# Patient Record
Sex: FEMALE | Race: WHITE | NOT HISPANIC OR LATINO | Employment: FULL TIME | URBAN - METROPOLITAN AREA
[De-identification: names, ages, dates, MRNs, and addresses within clinical notes are randomized per-mention and may not be internally consistent; named-entity substitution may affect disease eponyms.]

---

## 2022-11-04 ENCOUNTER — OFFICE VISIT (OUTPATIENT)
Dept: FAMILY MEDICINE | Facility: CLINIC | Age: 32
End: 2022-11-04
Payer: COMMERCIAL

## 2022-11-04 VITALS
TEMPERATURE: 98.1 F | OXYGEN SATURATION: 98 % | SYSTOLIC BLOOD PRESSURE: 130 MMHG | RESPIRATION RATE: 16 BRPM | DIASTOLIC BLOOD PRESSURE: 85 MMHG | BODY MASS INDEX: 34.29 KG/M2 | HEIGHT: 65 IN | HEART RATE: 68 BPM | WEIGHT: 205.8 LBS

## 2022-11-04 DIAGNOSIS — F33.41 RECURRENT MAJOR DEPRESSIVE DISORDER, IN PARTIAL REMISSION (H): Primary | ICD-10-CM

## 2022-11-04 DIAGNOSIS — Z23 HIGH PRIORITY FOR 2019-NCOV VACCINE: ICD-10-CM

## 2022-11-04 DIAGNOSIS — Z23 ENCOUNTER FOR IMMUNIZATION: ICD-10-CM

## 2022-11-04 PROCEDURE — 96127 BRIEF EMOTIONAL/BEHAV ASSMT: CPT | Performed by: PREVENTIVE MEDICINE

## 2022-11-04 PROCEDURE — 0124A COVID-19,PF,PFIZER BOOSTER BIVALENT: CPT | Performed by: PREVENTIVE MEDICINE

## 2022-11-04 PROCEDURE — 90686 IIV4 VACC NO PRSV 0.5 ML IM: CPT | Performed by: PREVENTIVE MEDICINE

## 2022-11-04 PROCEDURE — 90471 IMMUNIZATION ADMIN: CPT | Performed by: PREVENTIVE MEDICINE

## 2022-11-04 PROCEDURE — 99203 OFFICE O/P NEW LOW 30 MIN: CPT | Mod: 25 | Performed by: PREVENTIVE MEDICINE

## 2022-11-04 PROCEDURE — 91312 COVID-19,PF,PFIZER BOOSTER BIVALENT: CPT | Performed by: PREVENTIVE MEDICINE

## 2022-11-04 RX ORDER — BUPROPION HYDROCHLORIDE 150 MG/1
150 TABLET ORAL EVERY MORNING
Qty: 30 TABLET | Refills: 1 | Status: SHIPPED | OUTPATIENT
Start: 2022-11-04

## 2022-11-04 RX ORDER — BUPROPION HYDROCHLORIDE 150 MG/1
TABLET ORAL
COMMUNITY
Start: 2022-08-29 | End: 2022-11-04

## 2022-11-04 RX ORDER — HYDROXYZINE PAMOATE 25 MG/1
25 CAPSULE ORAL 3 TIMES DAILY PRN
Qty: 60 CAPSULE | Refills: 1 | Status: SHIPPED | OUTPATIENT
Start: 2022-11-04

## 2022-11-04 RX ORDER — HYDROXYZINE PAMOATE 25 MG/1
CAPSULE ORAL
COMMUNITY
Start: 2022-08-29 | End: 2022-11-04

## 2022-11-04 ASSESSMENT — PATIENT HEALTH QUESTIONNAIRE - PHQ9
SUM OF ALL RESPONSES TO PHQ QUESTIONS 1-9: 20
5. POOR APPETITE OR OVEREATING: SEVERAL DAYS

## 2022-11-04 ASSESSMENT — ANXIETY QUESTIONNAIRES
GAD7 TOTAL SCORE: 7
2. NOT BEING ABLE TO STOP OR CONTROL WORRYING: NOT AT ALL
1. FEELING NERVOUS, ANXIOUS, OR ON EDGE: SEVERAL DAYS
IF YOU CHECKED OFF ANY PROBLEMS ON THIS QUESTIONNAIRE, HOW DIFFICULT HAVE THESE PROBLEMS MADE IT FOR YOU TO DO YOUR WORK, TAKE CARE OF THINGS AT HOME, OR GET ALONG WITH OTHER PEOPLE: SOMEWHAT DIFFICULT
GAD7 TOTAL SCORE: 7
6. BECOMING EASILY ANNOYED OR IRRITABLE: MORE THAN HALF THE DAYS
7. FEELING AFRAID AS IF SOMETHING AWFUL MIGHT HAPPEN: MORE THAN HALF THE DAYS
3. WORRYING TOO MUCH ABOUT DIFFERENT THINGS: SEVERAL DAYS
5. BEING SO RESTLESS THAT IT IS HARD TO SIT STILL: NOT AT ALL

## 2022-11-04 ASSESSMENT — PAIN SCALES - GENERAL: PAINLEVEL: NO PAIN (0)

## 2022-11-04 NOTE — PROGRESS NOTES
"  Assessment & Plan     Recurrent major depressive disorder, in partial remission (H)  -refilled medication   -symptoms are controlled on current plan  -has appointment with Psychiatry at [facility] 11/15/22  -continue with weekly therapy with [facility]   - buPROPion (WELLBUTRIN XL) 150 MG 24 hr tablet  Dispense: 30 tablet; Refill: 1  - hydrOXYzine (VISTARIL) 25 MG capsule  Dispense: 60 capsule; Refill: 1  -no plans for self harm  -scheduled patient for a Physical/pap and IUD removal   -has been getting more physical activity  -has lost weight over the last months with lifestyle changes     Encounter for immunization  - INFLUENZA VACCINE IM > 6 MONTHS VALENT IIV4 (AFLURIA/FLUZONE)    High priority for 2019-nCoV vaccine  - COVID-19,PF,PFIZER BOOSTER BIVALENT 12+Yrs      Prescription drug management  25 minutes spent on the date of the encounter doing chart review, history and exam, documentation and further activities per the note       BMI:   Estimated body mass index is 33.98 kg/m  as calculated from the following:    Height as of this encounter: 1.657 m (5' 5.25\").    Weight as of this encounter: 93.4 kg (205 lb 12.8 oz).   Weight management plan: Discussed healthy diet and exercise guidelines    Depression Screening Follow Up    PHQ 11/4/2022   PHQ-9 Total Score 20   Q9: Thoughts of better off dead/self-harm past 2 weeks More than half the days           Follow Up    Follow Up Actions Taken  Crisis resource information provided in the After Visit Summary  Has appointment with Psychiatry 11/15/22 and doing weekly therapy      Return in about 4 weeks (around 12/2/2022) for Routine preventive, with me, in person.    Mikala Clay MD MPH    [facility]    Regino Poasdas is a 32 year old{, presenting for the following health issues:  Medication Request, Mental Health Problem, and Imm/Inj (COVID-19 VACCINE)      HPI     Abnormal Mood Symptoms  Onset/Duration:  Several months " "  Description: seeing and hearing things  Depression (if yes, do PHQ-9): YES  Anxiety (if yes, do FAITH-7): YES  Accompanying Signs & Symptoms:  Still participating in activities that you used to enjoy: \"some- what\"  Fatigue: YES  Irritability: YES  Difficulty concentrating: YES  Changes in appetite: YES  Problems with sleep: YES  Heart racing/beating fast: No  Abnormally elevated, expansive, or irritable mood: YES  Persistently increased activity or energy: No  Thoughts of hurting yourself or others: YES  History:  Recent stress or major life event: YES mother is dying   Prior depression or anxiety: yes   Family history of depression or anxiety: YES  Alcohol/drug use: No  Difficulty sleeping: YES  Precipitating or alleviating factors: Rx medication   Therapies tried and outcome: ndividual therapy  PHQ 11/4/2022   PHQ-9 Total Score 20   Q9: Thoughts of better off dead/self-harm past 2 weeks More than half the days     FAITH-7 SCORE 11/4/2022   Total Score 7       Felt better within a few days of starting Wellbutrin that was prescribed in the emergency room   No further hallucinations, no auditory hallucinations   Baseline self harm thoughts, has always had these chronic thoughts   No plans for self harm   Sleep is better, 6 hours per night  Depression is better  Needs refills on medication today   Last 15 days has been on 200 mg of Wellbutrin   No history of seizures or eating disorders       [facility] psychiatry appointment 11/15/22 and they will take over medication management but she needs refills on her medication till then   Weekly therapy, telehealth appointment with [facility]     Seen in the ED 8/29/22:      \"PRIMARY PSYCHIATRIC DIAGNOSES   PTSD, major depressive disorder.   PSYCHIATRIC FORMULATION   Based on my current evaluation and assessment of the patient, Katharina Roa is a 32 y.o. female who presents with complaints of thinking she saw a camera in her home that was not there. It was really as spoon. Pt " "reports she has been under a lot of stress lately. Mother is a hoarder and pt is helping her keep her home. Pt has a known hx of PTSD and depression. She currently endorses numerous sxs of depression (see above). She felt stress was getting to her and she thought she saw a camera that was not there. She realizes she was wrong. Pt took meds in the past but is not on any now. She felt they were helpful. Presentation is consistent with PTSD triggered by stress and major depressive disorder.      RECOMMENDATIONS       Inpt psych admission recommended: [] YES [x] NO    Medication recommendations: Start Wellbutrin  mg po q d    Non-Medication recommendations: Refer to psychiatrist and therapist\"    Review of Systems   Constitutional, HEENT, cardiovascular, pulmonary, gi and gu systems are negative, except as otherwise noted.      Objective    /85 (BP Location: Left arm, Patient Position: Chair, Cuff Size: Adult Regular)   Pulse 68   Temp 98.1  F (36.7  C) (Tympanic)   Resp 16   Ht 1.657 m (5' 5.25\")   Wt 93.4 kg (205 lb 12.8 oz)   SpO2 98%   BMI 33.98 kg/m    Body mass index is 33.98 kg/m .  Physical Exam   GENERAL APPEARANCE: healthy, alert and no distress  EYES: Eyes grossly normal to inspection and conjunctivae and sclerae normal  RESP: lungs clear to auscultation - no rales, rhonchi or wheezes  CV: regular rates and rhythm, normal S1 S2, no S3 or S4 and no murmur, click or rub  ABDOMEN: soft, non-tender and no rebound or guarding   MS: extremities normal- no gross deformities noted and peripheral pulses normal  SKIN: no suspicious lesions or rashes  NEURO: Normal strength and tone, mentation intact and speech normal  PSYCH: mentation appears normal      No results found for this or any previous visit (from the past 24 hour(s)).          "

## 2022-11-04 NOTE — NURSING NOTE
Prior to immunization administration, verified patients identity using patient s name and date of birth. Please see Immunization Activity for additional information.     Screening Questionnaire for Adult Immunization    Are you sick today?   No   Do you have allergies to medications, food, a vaccine component or latex?   No   Have you ever had a serious reaction after receiving a vaccination?   No   Do you have a long-term health problem with heart, lung, kidney, or metabolic disease (e.g., diabetes), asthma, a blood disorder, no spleen, complement component deficiency, a cochlear implant, or a spinal fluid leak?  Are you on long-term aspirin therapy?   No   Do you have cancer, leukemia, HIV/AIDS, or any other immune system problem?   No   Do you have a parent, brother, or sister with an immune system problem?   No   In the past 3 months, have you taken medications that affect  your immune system, such as prednisone, other steroids, or anticancer drugs; drugs for the treatment of rheumatoid arthritis, Crohn s disease, or psoriasis; or have you had radiation treatments?   No   Have you had a seizure, or a brain or other nervous system problem?   No   During the past year, have you received a transfusion of blood or blood    products, or been given immune (gamma) globulin or antiviral drug?   No   For women: Are you pregnant or is there a chance you could become       pregnant during the next month?   No   Have you received any vaccinations in the past 4 weeks?   No     Immunization questionnaire answers were all negative.        Per orders of Dr. Clay, injection of influenza and bivalent pfizer given by Silvia Moss MA. Patient instructed to remain in clinic for 15 minutes afterwards, and to report any adverse reaction to me immediately.       Screening performed by Silvia Moss MA on 11/4/2022 at 4:33 PM.

## 2023-05-09 ENCOUNTER — MEDICAL CORRESPONDENCE (OUTPATIENT)
Dept: HEALTH INFORMATION MANAGEMENT | Facility: CLINIC | Age: 33
End: 2023-05-09

## 2023-05-09 ENCOUNTER — HOSPITAL ENCOUNTER (EMERGENCY)
Facility: CLINIC | Age: 33
Discharge: HOME | End: 2023-05-10
Attending: EMERGENCY MEDICINE | Admitting: EMERGENCY MEDICINE

## 2023-05-09 DIAGNOSIS — T50.902A INTENTIONAL DRUG OVERDOSE, INITIAL ENCOUNTER (H): ICD-10-CM

## 2023-05-09 DIAGNOSIS — T14.91XA SUICIDE ATTEMPT (H): ICD-10-CM

## 2023-05-09 LAB
ALBUMIN SERPL BCG-MCNC: 4.8 G/DL (ref 3.5–5.2)
ALP SERPL-CCNC: 68 U/L (ref 35–104)
ALT SERPL W P-5'-P-CCNC: 32 U/L (ref 10–35)
ANION GAP SERPL CALCULATED.3IONS-SCNC: 16 MMOL/L (ref 7–15)
APAP SERPL-MCNC: <5 UG/ML (ref 10–30)
AST SERPL W P-5'-P-CCNC: 20 U/L (ref 10–35)
BASOPHILS # BLD AUTO: 0 10E3/UL (ref 0–0.2)
BASOPHILS NFR BLD AUTO: 0 %
BILIRUB SERPL-MCNC: 0.4 MG/DL
BUN SERPL-MCNC: 10 MG/DL (ref 6–20)
CALCIUM SERPL-MCNC: 9.9 MG/DL (ref 8.6–10)
CHLORIDE SERPL-SCNC: 105 MMOL/L (ref 98–107)
CREAT SERPL-MCNC: 1.19 MG/DL (ref 0.51–0.95)
DEPRECATED HCO3 PLAS-SCNC: 22 MMOL/L (ref 22–29)
EOSINOPHIL # BLD AUTO: 0.1 10E3/UL (ref 0–0.7)
EOSINOPHIL NFR BLD AUTO: 1 %
ERYTHROCYTE [DISTWIDTH] IN BLOOD BY AUTOMATED COUNT: 11.7 % (ref 10–15)
ETHANOL SERPL-MCNC: <0.01 G/DL
FLUAV RNA SPEC QL NAA+PROBE: NEGATIVE
FLUBV RNA RESP QL NAA+PROBE: NEGATIVE
GFR SERPL CREATININE-BSD FRML MDRD: 62 ML/MIN/1.73M2
GLUCOSE SERPL-MCNC: 92 MG/DL (ref 70–99)
HCT VFR BLD AUTO: 46 % (ref 35–47)
HGB BLD-MCNC: 15.5 G/DL (ref 11.7–15.7)
HOLD SPECIMEN: NORMAL
IMM GRANULOCYTES # BLD: 0 10E3/UL
IMM GRANULOCYTES NFR BLD: 0 %
LYMPHOCYTES # BLD AUTO: 1.9 10E3/UL (ref 0.8–5.3)
LYMPHOCYTES NFR BLD AUTO: 18 %
MCH RBC QN AUTO: 32.6 PG (ref 26.5–33)
MCHC RBC AUTO-ENTMCNC: 33.7 G/DL (ref 31.5–36.5)
MCV RBC AUTO: 97 FL (ref 78–100)
MONOCYTES # BLD AUTO: 0.5 10E3/UL (ref 0–1.3)
MONOCYTES NFR BLD AUTO: 5 %
NEUTROPHILS # BLD AUTO: 7.6 10E3/UL (ref 1.6–8.3)
NEUTROPHILS NFR BLD AUTO: 76 %
NRBC # BLD AUTO: 0 10E3/UL
NRBC BLD AUTO-RTO: 0 /100
PLATELET # BLD AUTO: 209 10E3/UL (ref 150–450)
POTASSIUM SERPL-SCNC: 3.7 MMOL/L (ref 3.4–5.3)
PROT SERPL-MCNC: 7.1 G/DL (ref 6.4–8.3)
RBC # BLD AUTO: 4.76 10E6/UL (ref 3.8–5.2)
RSV RNA SPEC NAA+PROBE: NEGATIVE
SALICYLATES SERPL-MCNC: <0.3 MG/DL
SARS-COV-2 RNA RESP QL NAA+PROBE: NEGATIVE
SODIUM SERPL-SCNC: 143 MMOL/L (ref 136–145)
WBC # BLD AUTO: 10.1 10E3/UL (ref 4–11)

## 2023-05-09 PROCEDURE — 258N000003 HC RX IP 258 OP 636: Performed by: EMERGENCY MEDICINE

## 2023-05-09 PROCEDURE — 80143 DRUG ASSAY ACETAMINOPHEN: CPT | Performed by: EMERGENCY MEDICINE

## 2023-05-09 PROCEDURE — C9803 HOPD COVID-19 SPEC COLLECT: HCPCS | Performed by: EMERGENCY MEDICINE

## 2023-05-09 PROCEDURE — 93005 ELECTROCARDIOGRAM TRACING: CPT | Mod: RTG

## 2023-05-09 PROCEDURE — 85025 COMPLETE CBC W/AUTO DIFF WBC: CPT | Performed by: EMERGENCY MEDICINE

## 2023-05-09 PROCEDURE — 96360 HYDRATION IV INFUSION INIT: CPT | Performed by: EMERGENCY MEDICINE

## 2023-05-09 PROCEDURE — 93010 ELECTROCARDIOGRAM REPORT: CPT | Performed by: EMERGENCY MEDICINE

## 2023-05-09 PROCEDURE — 87637 SARSCOV2&INF A&B&RSV AMP PRB: CPT | Performed by: EMERGENCY MEDICINE

## 2023-05-09 PROCEDURE — 80053 COMPREHEN METABOLIC PANEL: CPT | Performed by: EMERGENCY MEDICINE

## 2023-05-09 PROCEDURE — 90791 PSYCH DIAGNOSTIC EVALUATION: CPT

## 2023-05-09 PROCEDURE — 36415 COLL VENOUS BLD VENIPUNCTURE: CPT | Performed by: EMERGENCY MEDICINE

## 2023-05-09 PROCEDURE — 80179 DRUG ASSAY SALICYLATE: CPT | Performed by: EMERGENCY MEDICINE

## 2023-05-09 PROCEDURE — 99285 EMERGENCY DEPT VISIT HI MDM: CPT | Mod: 25 | Performed by: EMERGENCY MEDICINE

## 2023-05-09 PROCEDURE — 82077 ASSAY SPEC XCP UR&BREATH IA: CPT | Performed by: EMERGENCY MEDICINE

## 2023-05-09 PROCEDURE — 99285 EMERGENCY DEPT VISIT HI MDM: CPT | Mod: 25,CS | Performed by: EMERGENCY MEDICINE

## 2023-05-09 RX ADMIN — SODIUM CHLORIDE 1000 ML: 9 INJECTION, SOLUTION INTRAVENOUS at 19:11

## 2023-05-09 ASSESSMENT — COLUMBIA-SUICIDE SEVERITY RATING SCALE - C-SSRS
5. HAVE YOU STARTED TO WORK OUT OR WORKED OUT THE DETAILS OF HOW TO KILL YOURSELF? DO YOU INTEND TO CARRY OUT THIS PLAN?: YES
TOTAL  NUMBER OF ABORTED OR SELF INTERRUPTED ATTEMPTS LIFETIME: NO
TOTAL  NUMBER OF ACTUAL ATTEMPTS LIFETIME: 1
4. HAVE YOU HAD THESE THOUGHTS AND HAD SOME INTENTION OF ACTING ON THEM?: NO
4. HAVE YOU HAD THESE THOUGHTS AND HAD SOME INTENTION OF ACTING ON THEM?: NO
2. HAVE YOU ACTUALLY HAD ANY THOUGHTS OF KILLING YOURSELF?: YES
3. HAVE YOU BEEN THINKING ABOUT HOW YOU MIGHT KILL YOURSELF?: NO
LETHALITY/MEDICAL DAMAGE CODE MOST RECENT POTENTIAL ATTEMPT: BEHAVIOR LIKELY TO RESULT IN DEATH DESPITE AVAILABLE MEDICAL CARE
2. HAVE YOU ACTUALLY HAD ANY THOUGHTS OF KILLING YOURSELF?: YES
LETHALITY/MEDICAL DAMAGE CODE MOST RECENT ACTUAL ATTEMPT: NO PHYSICAL DAMAGE OR VERY MINOR PHYSICAL DAMAGE
6. HAVE YOU EVER DONE ANYTHING, STARTED TO DO ANYTHING, OR PREPARED TO DO ANYTHING TO END YOUR LIFE?: NO
1. HAVE YOU WISHED YOU WERE DEAD OR WISHED YOU COULD GO TO SLEEP AND NOT WAKE UP?: YES
5. HAVE YOU STARTED TO WORK OUT OR WORKED OUT THE DETAILS OF HOW TO KILL YOURSELF? DO YOU INTEND TO CARRY OUT THIS PLAN?: YES
1. IN THE PAST MONTH, HAVE YOU WISHED YOU WERE DEAD OR WISHED YOU COULD GO TO SLEEP AND NOT WAKE UP?: YES
ATTEMPT PAST THREE MONTHS: YES
TOTAL  NUMBER OF ACTUAL ATTEMPTS PAST 3 MONTHS: 1
ATTEMPT LIFETIME: YES
TOTAL  NUMBER OF INTERRUPTED ATTEMPTS LIFETIME: NO
MOST RECENT DATE: 66603

## 2023-05-09 ASSESSMENT — ACTIVITIES OF DAILY LIVING (ADL)
ADLS_ACUITY_SCORE: 35

## 2023-05-09 NOTE — ED PROVIDER NOTES
"ED Provider Note  [facility]      History     Chief Complaint   Patient presents with     Ingestion     One hour ago Took unknown amount of po benadryl, suicide attempt, crisis here with pt.     HPI  Katharina Roa is a 33 year old female who presents to the emergency department today after a suicide attempt.  Patient reports a history of depression, anxiety, and PTSD.  She is on mental health medications including Wellbutrin and recently started Lexapro.  She does see a therapist twice a week.  Patient reports she has been very stressed recently.  She states that last year, the woman who she considers to be her mother had her foot amputated.  As a result, the patient needed to go over to her home to help her out at which point she discovered that this woman was a hoarder.  The patient has been slowly working to clear out this woman's home and has been very traumatized by this process.  She reports she is not getting any help from anyone doing this and feels very stressed and overwhelmed.  She states that none of her friends want to hear about this because it is uncomfortable.  She also reports that she feels she has disappointed this woman and feels unsupported by her friends.  Additionally she is having trouble at work.  She works as a clinical researcher and states that she has had significant problems doing her job and is concerned that she may be fired soon.    She does have a prior history of self-injurious behavior.  However prior to today, she has never attempted to kill herself.  Approximately 1 hour prior to arrival, the patient took \"a handful \"of Benadryl tablets and a deliberate attempt to kill herself.  These were 25 mg Benadryl tablets.  She estimates that she took 12 of them and believes it was about 300 mg of Benadryl.  She denies any coingestants.  This was a deliberate suicide attempt.  She did not vomit afterwards, though she states that she tried to force herself to " "vomit.  When asked how she feels now, she says \"I feel like I should have taken the whole bottle because obviously it didn't work.\"    When asked about alcohol use, patient states that in general she drinks perhaps 3 drinks 1-2 times per week.  She states that she sometimes drinks beer, cocktails, or wine.  Yesterday she drank a full bottle of wine.  She does admit to smoking marijuana on a daily basis, denies other drug use.    The patient arrived in the emergency department with a crisis [facility] staff member.  In the handwritten report by the [facility] staff member, she reports that she told friends that she is hopeless and asked if it was okay if she was not around anymore.  She went to the pharmacy and then to the graveyard.  Apparently she has not eaten in 24 hours, and she told her friend that she would lie to anybody about her symptoms.  When responders arrived she told [facility] that she had just taken an unknown amount of Benadryl and said \"I have to get to the hospital \".    No past medical history on file.    No past surgical history on file.    No family history on file.    Social History     Tobacco Use     Smoking status: Not on file     Smokeless tobacco: Not on file   Vaping Use     Vaping status: Not on file   Substance Use Topics     Alcohol use: Not on file           Past Medical History  No past medical history on file.  No past surgical history on file.  buPROPion (WELLBUTRIN XL) 150 MG 24 hr tablet  hydrOXYzine (VISTARIL) 25 MG capsule      Allergies   Allergen Reactions     Cinnamon Hives     Family History  No family history on file.  Social History          A medically appropriate review of systems was performed with pertinent positives and negatives noted in the HPI, and all other systems negative.    Physical Exam   BP: (!) 157/104  Pulse: 83  Temp: 98.2  F (36.8  C)  Resp: 16  SpO2: 97 %  Physical Exam  Vitals and nursing note reviewed.   Constitutional:       General: She is in acute distress.      " Appearance: She is not diaphoretic.      Comments: Adult female, alert, cooperative, tearful   HENT:      Head: Atraumatic.      Mouth/Throat:      Mouth: Mucous membranes are moist.      Pharynx: Oropharynx is clear. No oropharyngeal exudate or posterior oropharyngeal erythema.   Eyes:      General: No scleral icterus.     Pupils: Pupils are equal, round, and reactive to light.   Cardiovascular:      Rate and Rhythm: Normal rate.      Pulses: Normal pulses.      Heart sounds: Normal heart sounds. No murmur heard.  Pulmonary:      Effort: No respiratory distress.      Breath sounds: Normal breath sounds.   Abdominal:      General: Bowel sounds are normal.      Palpations: Abdomen is soft.      Tenderness: There is no abdominal tenderness.      Comments: Anticipatory guarding noted when attempting to palpate abdomen. With distraction, able to palpate deeply in all quadrant without guarding or rebound. Hypoactive bowel sounds.    Musculoskeletal:         General: No tenderness.   Skin:     General: Skin is warm.      Findings: No rash.   Neurological:      Mental Status: She is alert.   Psychiatric:      Comments: Tearful affect. Unremarkable appearance. Good eye contact. SI with continued plan - endorses regret that her suicide attempt was unsuccessful. Not obviously attending to internal stimuli.         ED Course, Procedures, & Data      Procedures        EKG Interpretation:      Interpreted by Kaley Barrett MD  Time reviewed:1910   Symptoms at time of EKG: none   Rhythm: normal sinus   Rate: normal  Axis: NORMAL  Ectopy: none  Conduction: normal  ST Segments/ T Waves: No ST-T wave changes  Q Waves: none  Comparison to prior: No old EKG available    Clinical Impression: normal EKG           Mental Health Risk Assessment      PSS-3    Date and Time Over the past 2 weeks have you felt down, depressed, or hopeless? Over the past 2 weeks have you had thoughts of killing yourself? Have you ever attempted to kill  yourself? When did this last happen? User   05/09/23 1832 yes yes -- -- SMS      C-SSRS ([facility])    Date and Time Q1 Wished to be Dead (Past Month) Q2 Suicidal Thoughts (Past Month) Q3 Suicidal Thought Method Q4 Suicidal Intent without Specific Plan Q5 Suicide Intent with Specific Plan Q6 Suicide Behavior (Lifetime) Within the Past 3 Months? RETIRED: Level of Risk per Screen Screening Not Complete User   05/09/23 1832 yes yes yes yes yes yes -- -- -- [facility]              Suicide assessment completed by mental health (D.E.C., LCSW, etc.)       Results for orders placed or performed during the hospital encounter of 05/09/23   [place] Draw     Status: None    Narrative    The following orders were created for panel order [place] Draw.  Procedure                               Abnormality         Status                     ---------                               -----------         ------                     Extra Blue Top Tube[[id number]]                              Final result               Extra Red Top Tube[[id number]]                               Final result               Extra Green Top (Lithium...[[id number]]                      Final result               Extra Purple Top Tube[[id number]]                            Final result                 Please view results for these tests on the individual orders.   Extra Blue Top Tube     Status: None   Result Value Ref Range    Hold Specimen JIC    Extra Red Top Tube     Status: None   Result Value Ref Range    Hold Specimen JIC    Extra Green Top (Lithium Heparin) Tube     Status: None   Result Value Ref Range    Hold Specimen JIC    Extra Purple Top Tube     Status: None   Result Value Ref Range    Hold Specimen JIC    Comprehensive metabolic panel     Status: Abnormal   Result Value Ref Range    Sodium 143 136 - 145 mmol/L    Potassium 3.7 3.4 - 5.3 mmol/L    Chloride 105 98 - 107 mmol/L    Carbon Dioxide (CO2) 22 22 - 29 mmol/L    Anion Gap 16 (H) 7 - 15 mmol/L    Urea  Nitrogen 10.0 6.0 - 20.0 mg/dL    Creatinine 1.19 (H) 0.51 - 0.95 mg/dL    Calcium 9.9 8.6 - 10.0 mg/dL    Glucose 92 70 - 99 mg/dL    Alkaline Phosphatase 68 35 - 104 U/L    AST 20 10 - 35 U/L    ALT 32 10 - 35 U/L    Protein Total 7.1 6.4 - 8.3 g/dL    Albumin 4.8 3.5 - 5.2 g/dL    Bilirubin Total 0.4 <=1.2 mg/dL    GFR Estimate 62 >60 mL/min/1.73m2   Salicylate level     Status: Normal   Result Value Ref Range    Salicylate <0.3   mg/dL   Acetaminophen level     Status: Abnormal   Result Value Ref Range    Acetaminophen <5.0 (L) 10.0 - 30.0 ug/mL   Alcohol level blood     Status: Normal   Result Value Ref Range    Alcohol ethyl <0.01 <=0.01 g/dL   Symptomatic Influenza A/B, RSV, & SARS-CoV2 PCR (COVID-19) Nasopharyngeal     Status: Normal    Specimen: Nasopharyngeal; Swab   Result Value Ref Range    Influenza A PCR Negative Negative    Influenza B PCR Negative Negative    RSV PCR Negative Negative    SARS CoV2 PCR Negative Negative    Narrative    Testing was performed using the Xpert Xpress CoV2/Flu/RSV Assay on the [employer] GeneXpert Instrument. This test should be ordered for the detection of SARS-CoV-2, influenza, and RSV viruses in individuals who meet clinical and/or epidemiological criteria. Test performance is unknown in asymptomatic patients. This test is for in vitro diagnostic use under the FDA EUA for laboratories certified under CLIA to perform high or moderate complexity testing. This test has not been FDA cleared or approved. A negative result does not rule out the presence of PCR inhibitors in the specimen or target RNA in concentration below the limit of detection for the assay. If only one viral target is positive but coinfection with multiple targets is suspected, the sample should be re-tested with another FDA cleared, approved, or authorized test, if coinfection would change clinical management. This test was validated by the [facility]. These laboratories are certified  under the Clinical Laboratory Improvement Amendments of 1988 (CLIA-88) as qualified to perform high complexity laboratory testing.   CBC with platelets and differential     Status: None   Result Value Ref Range    WBC Count 10.1 4.0 - 11.0 10e3/uL    RBC Count 4.76 3.80 - 5.20 10e6/uL    Hemoglobin 15.5 11.7 - 15.7 g/dL    Hematocrit 46.0 35.0 - 47.0 %    MCV 97 78 - 100 fL    MCH 32.6 26.5 - 33.0 pg    MCHC 33.7 31.5 - 36.5 g/dL    RDW 11.7 10.0 - 15.0 %    Platelet Count 209 150 - 450 10e3/uL    % Neutrophils 76 %    % Lymphocytes 18 %    % Monocytes 5 %    % Eosinophils 1 %    % Basophils 0 %    % Immature Granulocytes 0 %    NRBCs per 100 WBC 0 <1 /100    Absolute Neutrophils 7.6 1.6 - 8.3 10e3/uL    Absolute Lymphocytes 1.9 0.8 - 5.3 10e3/uL    Absolute Monocytes 0.5 0.0 - 1.3 10e3/uL    Absolute Eosinophils 0.1 0.0 - 0.7 10e3/uL    Absolute Basophils 0.0 0.0 - 0.2 10e3/uL    Absolute Immature Granulocytes 0.0 <=0.4 10e3/uL    Absolute NRBCs 0.0 10e3/uL   EKG 12 lead     Status: None (Preliminary result)   Result Value Ref Range    Systolic Blood Pressure  mmHg    Diastolic Blood Pressure  mmHg    Ventricular Rate 68 BPM    Atrial Rate 68 BPM    MS Interval 162 ms    QRS Duration 90 ms     ms    QTc 435 ms    P Axis 16 degrees    R AXIS 70 degrees    T Axis 35 degrees    Interpretation ECG Sinus rhythm  Normal ECG      CBC with Platelets & Differential     Status: None    Narrative    The following orders were created for panel order CBC with Platelets & Differential.  Procedure                               Abnormality         Status                     ---------                               -----------         ------                     CBC with platelets and d...[[id number]]                      Final result                 Please view results for these tests on the individual orders.     Medications   acetaminophen (TYLENOL) tablet 650 mg (has no administration in time range)   ibuprofen  (ADVIL/MOTRIN) tablet 600 mg (has no administration in time range)   melatonin tablet 3 mg (has no administration in time range)   0.9% sodium chloride BOLUS (0 mLs Intravenous Stopped 5/9/23 2011)       No orders to display          Critical care was not performed.     Medical Decision Making  The patient's presentation was of moderate complexity (an acute illness with systemic symptoms).    The patient's evaluation involved:  ordering and/or review of 3+ test(s) in this encounter (see separate area of note for details)  discussion of management or test interpretation with another health professional (see separate area of note for details)    The patient's management necessitated high risk (a decision regarding hospitalization).      Assessment & Plan    Patient presents to the emergency department today after a deliberate overdose in an attempt to commit suicide.  She reports she took 300 mg of Benadryl.  When asked how she is currently feeling, she reports that she is unhappy that she did not succeed and wishes she had taken more.  She does not appear to be sedated and her hemodynamics are completely within normal limits.    EKG was done and this is normal with normal QRS and normal QTc.  We did establish IV access and we did draw blood for laboratory analysis.  CBC is within normal limits.  Comprehensive metabolic panel showed a creatinine of 1.19, anion gap is 16, otherwise within normal limits.  Tylenol and salicylate levels were negative.  Blood alcohol was negative.  UPT and urine tox screen have been ordered but not yet provided.  Due to report of sore throat for the past week we did do a COVID swab which was negative, influenza swab was negative.    We did speak to poison control with regard to this overdose, they recommended a 6-hour period of monitoring.  We did complete this, at no point did she show any anticholinergic syndrome or evidence of anticholinergic toxicity.  We discontinued medical  monitoring at the 6-hour chu.    She was seen by the [facility] , please see her note for full details.  At this point patient will be admitted to the hospital on the mental health service.  She is unhappy about coming into the hospital but does understand the purpose for doing so.  At the moment she is voluntary but she is absolutely holdable should she try to leave.    I have reviewed the nursing notes. I have reviewed the findings, diagnosis, plan and need for follow up with the patient.    New Prescriptions    No medications on file       Final diagnoses:   Intentional drug overdose, initial encounter (H)   Suicide attempt (H)       Kaley Barrett MD  [facility] EMERGENCY DEPARTMENT  5/9/2023     Kaley Barrett MD  05/10/23 0049

## 2023-05-09 NOTE — Clinical Note
Katharina Roa was seen and treated in our emergency department on 5/9/2023.  She may return to work on 05/15/2023.       If you have any questions or concerns, please don't hesitate to call.      Yasmin Paniagua MD

## 2023-05-09 NOTE — ED NOTES
"Pt has 2 friends in [facility] that state she has been drinking heavily.\"  "

## 2023-05-10 ENCOUNTER — TELEPHONE (OUTPATIENT)
Dept: BEHAVIORAL HEALTH | Facility: CLINIC | Age: 33
End: 2023-05-10

## 2023-05-10 VITALS
SYSTOLIC BLOOD PRESSURE: 135 MMHG | RESPIRATION RATE: 17 BRPM | OXYGEN SATURATION: 97 % | HEART RATE: 78 BPM | DIASTOLIC BLOOD PRESSURE: 92 MMHG | TEMPERATURE: 98 F

## 2023-05-10 LAB
ATRIAL RATE - MUSE: 68 BPM
DIASTOLIC BLOOD PRESSURE - MUSE: NORMAL MMHG
INTERPRETATION ECG - MUSE: NORMAL
P AXIS - MUSE: 16 DEGREES
PR INTERVAL - MUSE: 162 MS
QRS DURATION - MUSE: 90 MS
QT - MUSE: 410 MS
QTC - MUSE: 435 MS
R AXIS - MUSE: 70 DEGREES
SYSTOLIC BLOOD PRESSURE - MUSE: NORMAL MMHG
T AXIS - MUSE: 35 DEGREES
VENTRICULAR RATE- MUSE: 68 BPM

## 2023-05-10 PROCEDURE — 250N000013 HC RX MED GY IP 250 OP 250 PS 637: Performed by: EMERGENCY MEDICINE

## 2023-05-10 PROCEDURE — 250N000013 HC RX MED GY IP 250 OP 250 PS 637: Performed by: FAMILY MEDICINE

## 2023-05-10 RX ORDER — BUPROPION HYDROCHLORIDE 150 MG/1
150 TABLET ORAL EVERY MORNING
Status: DISCONTINUED | OUTPATIENT
Start: 2023-05-10 | End: 2023-05-10

## 2023-05-10 RX ORDER — BUPROPION HYDROCHLORIDE 300 MG/1
300 TABLET ORAL EVERY MORNING
Status: DISCONTINUED | OUTPATIENT
Start: 2023-05-10 | End: 2023-05-10 | Stop reason: HOSPADM

## 2023-05-10 RX ORDER — ACETAMINOPHEN 325 MG/1
650 TABLET ORAL EVERY 4 HOURS PRN
Status: DISCONTINUED | OUTPATIENT
Start: 2023-05-10 | End: 2023-05-10 | Stop reason: HOSPADM

## 2023-05-10 RX ORDER — IBUPROFEN 600 MG/1
600 TABLET, FILM COATED ORAL EVERY 6 HOURS PRN
Status: DISCONTINUED | OUTPATIENT
Start: 2023-05-10 | End: 2023-05-10 | Stop reason: HOSPADM

## 2023-05-10 RX ORDER — HYDROXYZINE HYDROCHLORIDE 25 MG/1
25 TABLET, FILM COATED ORAL 3 TIMES DAILY PRN
Status: DISCONTINUED | OUTPATIENT
Start: 2023-05-10 | End: 2023-05-10 | Stop reason: HOSPADM

## 2023-05-10 RX ORDER — SODIUM CHLORIDE 9 MG/ML
INJECTION, SOLUTION INTRAVENOUS
Status: DISCONTINUED
Start: 2023-05-10 | End: 2023-05-10

## 2023-05-10 RX ORDER — LANOLIN ALCOHOL/MO/W.PET/CERES
3 CREAM (GRAM) TOPICAL
Status: DISCONTINUED | OUTPATIENT
Start: 2023-05-10 | End: 2023-05-10 | Stop reason: HOSPADM

## 2023-05-10 RX ORDER — GABAPENTIN 100 MG/1
100 CAPSULE ORAL ONCE
Status: COMPLETED | OUTPATIENT
Start: 2023-05-10 | End: 2023-05-10

## 2023-05-10 RX ADMIN — BUPROPION HYDROCHLORIDE 300 MG: 150 TABLET, FILM COATED, EXTENDED RELEASE ORAL at 08:12

## 2023-05-10 RX ADMIN — GABAPENTIN 100 MG: 100 CAPSULE ORAL at 14:42

## 2023-05-10 ASSESSMENT — COLUMBIA-SUICIDE SEVERITY RATING SCALE - C-SSRS
ATTEMPT SINCE LAST CONTACT: NO
6. HAVE YOU EVER DONE ANYTHING, STARTED TO DO ANYTHING, OR PREPARED TO DO ANYTHING TO END YOUR LIFE?: NO
2. HAVE YOU ACTUALLY HAD ANY THOUGHTS OF KILLING YOURSELF?: YES
SUICIDE, SINCE LAST CONTACT: NO
TOTAL  NUMBER OF ABORTED OR SELF INTERRUPTED ATTEMPTS SINCE LAST CONTACT: NO
5. HAVE YOU STARTED TO WORK OUT OR WORKED OUT THE DETAILS OF HOW TO KILL YOURSELF? DO YOU INTEND TO CARRY OUT THIS PLAN?: NO
TOTAL  NUMBER OF INTERRUPTED ATTEMPTS SINCE LAST CONTACT: NO
1. SINCE LAST CONTACT, HAVE YOU WISHED YOU WERE DEAD OR WISHED YOU COULD GO TO SLEEP AND NOT WAKE UP?: NO

## 2023-05-10 ASSESSMENT — ACTIVITIES OF DAILY LIVING (ADL)
ADLS_ACUITY_SCORE: 35

## 2023-05-10 NOTE — ED NOTES
Asher is calm and cooperative. She attended group and participated. She denies any feelings of hurting herself or others. Denies any hallucinations.

## 2023-05-10 NOTE — PROGRESS NOTES
Triage & Transition Services, Extended Care    Client Name: Katharina Roa    Date: May 10, 2023  Service Type:  Group Therapy  Session Start Time:  11a    Session End Time: 1130a  Session Length: 30 min  Site Location: [facility]  Attendees: Patient and other group members  Facilitator: TAYLER Oconnor     Topic:   Window of tolerance    Intervention:    Group process: support, challenge, affirm, psycho-education.     Response:  Patient did participate in group. Behavior in group was engaged. When another group member shared the amount of time they had been in [facility], pt reported feeling outside her window of tolerance, expressed anxiety about being in the hospital, and not knowing how long she will be here for. Pt asked writer about being held at the hospital. Writer reassured pt that everyone has a different treatment plan and that it is possible she may not be here the same length of time as others. Writer offered reassurance that someone could follow up with pt regarding her plan of care. Pt left the group at 11:25am.       TAYLER Meadows

## 2023-05-10 NOTE — ED PROVIDER NOTES
[facility] ED Mental Health Handoff Note:       Brief HPI:  This is a 33 year old female signed out to me by Dr. Thakkar.  See initial ED Provider note for full details of the presentation. Interval history is pertinent for no new events.  Patient presented after an intentional Benadryl overdose, was appropriately monitored, and is now cleared medically..    Home meds reviewed and ordered/administered: Yes.  Initially did not restart home meds due to overdose, however now patient has had a sufficient period of time for monitoring to restart her home medications    Medically stable for inpatient mental health admission: Yes.    Evaluated by mental health: Yes. The recommendation is for inpatient mental health treatment. Bed search in process    Safety concerns: At the time I received sign out, there were no safety concerns.    Hold Status:  Active Orders   Legal    Health Officer Authority to Detain (DENNIS)     Frequency: Effective Now     Start Date/Time: 05/09/23 2141      Number of Occurrences: Until Specified     Order Comments: This patient presented with circumstances that have led me to be reasonably suspicious that the patient is at significant risk of self-harm. The patient's judgment to this situation appears to be impaired. Given the circumstances in which the patient presented, it is likely that the patient is at significant risk of attempting self harm if this situation is not investigated further. I am highly concerned that the patient is mentally ill and currently cannot safely care for oneself. This represents endangerment to the patient's well-being and safety, and I am placing a Health Officer Authority hold upon the patient at this time.              Exam:   Patient Vitals for the past 24 hrs:   BP Temp Temp src Pulse Resp SpO2   05/10/23 0630 123/87 -- -- 63 16 99 %   05/09/23 2345 -- -- -- 68 -- --   05/09/23 2330 -- -- -- 65 -- --   05/09/23 2315 -- -- -- 68 -- --   05/09/23 2300 -- -- --  76 -- --   05/09/23 2245 (!) 119/90 -- -- 68 -- --   05/09/23 2230 -- -- -- 71 -- 98 %   05/09/23 2215 -- -- -- 76 -- 98 %   05/09/23 2200 -- -- -- 66 -- 99 %   05/09/23 2145 -- -- -- 64 -- 98 %   05/09/23 2130 -- -- -- 66 -- 99 %   05/09/23 2115 -- -- -- 81 -- 97 %   05/09/23 2100 -- -- -- 83 -- 97 %   05/09/23 2045 -- -- -- 69 -- 100 %   05/09/23 2015 -- -- -- 73 -- 99 %   05/09/23 2000 (!) 148/105 -- -- 73 14 100 %   05/09/23 1945 (!) 133/95 -- -- 72 (!) 9 98 %   05/09/23 1930 (!) 129/94 -- -- 77 16 97 %   05/09/23 1915 (!) 137/103 -- -- 72 12 99 %   05/09/23 1900 (!) 132/96 -- -- 70 (!) 7 98 %   05/09/23 1831 (!) 157/104 98.2  F (36.8  C) Oral 83 16 97 %           ED Course:    Medications   acetaminophen (TYLENOL) tablet 650 mg (has no administration in time range)   ibuprofen (ADVIL/MOTRIN) tablet 600 mg (has no administration in time range)   melatonin tablet 3 mg (has no administration in time range)   buPROPion (WELLBUTRIN XL) 24 hr tablet 150 mg (has no administration in time range)   hydrOXYzine (VISTARIL) capsule 25 mg (has no administration in time range)   0.9% sodium chloride BOLUS (0 mLs Intravenous Stopped 5/9/23 2011)            There were no significant events during my shift.    Patient was signed out to the oncoming provider      Impression:    ICD-10-CM    1. Intentional drug overdose, initial encounter (H)  T50.902A       2. Suicide attempt (H)  T14.91XA           Plan:    1. Awaiting inpatient mental health admission/transfer.      RESULTS:   Results for orders placed or performed during the hospital encounter of 05/09/23 (from the past 24 hour(s))   [place] Draw     Status: None    Collection Time: 05/09/23  6:45 PM    Narrative    The following orders were created for panel order [place] Draw.  Procedure                               Abnormality         Status                     ---------                               -----------         ------                     Extra Blue Top  Tube[[id number]]                              Final result               Extra Red Top Tube[[id number]]                               Final result               Extra Green Top (Lithium...[[id number]]                      Final result               Extra Purple Top Tube[[id number]]                            Final result                 Please view results for these tests on the individual orders.   Extra Blue Top Tube     Status: None    Collection Time: 05/09/23  6:45 PM   Result Value Ref Range    Hold Specimen JIC    Extra Red Top Tube     Status: None    Collection Time: 05/09/23  6:45 PM   Result Value Ref Range    Hold Specimen JIC    Extra Green Top (Lithium Heparin) Tube     Status: None    Collection Time: 05/09/23  6:45 PM   Result Value Ref Range    Hold Specimen JIC    Extra Purple Top Tube     Status: None    Collection Time: 05/09/23  6:45 PM   Result Value Ref Range    Hold Specimen JIC    CBC with Platelets & Differential     Status: None    Collection Time: 05/09/23  6:45 PM    Narrative    The following orders were created for panel order CBC with Platelets & Differential.  Procedure                               Abnormality         Status                     ---------                               -----------         ------                     CBC with platelets and d...[[id number]]                      Final result                 Please view results for these tests on the individual orders.   Comprehensive metabolic panel     Status: Abnormal    Collection Time: 05/09/23  6:45 PM   Result Value Ref Range    Sodium 143 136 - 145 mmol/L    Potassium 3.7 3.4 - 5.3 mmol/L    Chloride 105 98 - 107 mmol/L    Carbon Dioxide (CO2) 22 22 - 29 mmol/L    Anion Gap 16 (H) 7 - 15 mmol/L    Urea Nitrogen 10.0 6.0 - 20.0 mg/dL    Creatinine 1.19 (H) 0.51 - 0.95 mg/dL    Calcium 9.9 8.6 - 10.0 mg/dL    Glucose 92 70 - 99 mg/dL    Alkaline Phosphatase 68 35 - 104 U/L    AST 20 10 - 35 U/L    ALT 32 10 - 35 U/L     Protein Total 7.1 6.4 - 8.3 g/dL    Albumin 4.8 3.5 - 5.2 g/dL    Bilirubin Total 0.4 <=1.2 mg/dL    GFR Estimate 62 >60 mL/min/1.73m2   Salicylate level     Status: Normal    Collection Time: 05/09/23  6:45 PM   Result Value Ref Range    Salicylate <0.3   mg/dL   Acetaminophen level     Status: Abnormal    Collection Time: 05/09/23  6:45 PM   Result Value Ref Range    Acetaminophen <5.0 (L) 10.0 - 30.0 ug/mL   Alcohol level blood     Status: Normal    Collection Time: 05/09/23  6:45 PM   Result Value Ref Range    Alcohol ethyl <0.01 <=0.01 g/dL   CBC with platelets and differential     Status: None    Collection Time: 05/09/23  6:45 PM   Result Value Ref Range    WBC Count 10.1 4.0 - 11.0 10e3/uL    RBC Count 4.76 3.80 - 5.20 10e6/uL    Hemoglobin 15.5 11.7 - 15.7 g/dL    Hematocrit 46.0 35.0 - 47.0 %    MCV 97 78 - 100 fL    MCH 32.6 26.5 - 33.0 pg    MCHC 33.7 31.5 - 36.5 g/dL    RDW 11.7 10.0 - 15.0 %    Platelet Count 209 150 - 450 10e3/uL    % Neutrophils 76 %    % Lymphocytes 18 %    % Monocytes 5 %    % Eosinophils 1 %    % Basophils 0 %    % Immature Granulocytes 0 %    NRBCs per 100 WBC 0 <1 /100    Absolute Neutrophils 7.6 1.6 - 8.3 10e3/uL    Absolute Lymphocytes 1.9 0.8 - 5.3 10e3/uL    Absolute Monocytes 0.5 0.0 - 1.3 10e3/uL    Absolute Eosinophils 0.1 0.0 - 0.7 10e3/uL    Absolute Basophils 0.0 0.0 - 0.2 10e3/uL    Absolute Immature Granulocytes 0.0 <=0.4 10e3/uL    Absolute NRBCs 0.0 10e3/uL   [facility] ([facility]) Assessment Consult Order:     Status: None ()    Collection Time: 05/09/23  6:55 PM    Kerri Stahl, [facility]     5/10/2023 12:20 AM  Diagnostic Evaluation Consultation  Crisis Assessment    Patient was assessed: In Person  Patient location: [facility] ED  Was a release of information signed: Yes. Providers included on   the release: MARTIN Garza and EMMA Macario Wood      Referral Data and Chief Complaint  Patient is a 33 year old, who uses  "she/her pronouns, and presents   to the ED with family/friends. Patient is referred to the ED by   community provider(s). Patient is presenting to the ED for the   following concerns: Suicide attempt by ingestion.  Patient took   \"a fistful of Benadryl,\" the patient said.  She denied prior   attempts.  She denied further SI, any NSSI, and any HI to this   ; but she told the ED MD that she wished she would've   taken a whole bottle.        Informed Consent and Assessment Methods     Patient is her own guardian. Writer met with patient and   explained the crisis assessment process, including applicable   information disclosures and limits to confidentiality, assessed   understanding of the process, and obtained consent to proceed   with the assessment. Patient was observed to be able to   participate in the assessment as evidenced by alert and oriented   presentation. Assessment methods included conducting a formal   interview with patient, review of medical records, collaboration   with medical staff, and obtaining relevant collateral information   from family and community providers when available..     Over the course of this crisis assessment provided reassurance,   offered validation, assisted in processing patient's thoughts and   feeling relating to friend's attempt to help her and provided   psychoeducation. Patient's response to interventions was   receptive, but she preferred to not stay.     Summary of Patient Situation     Patient was brought in by [facility] staff and friends due to a suicide   attempt by ingestion.  She was alert and oriented x 5. She   appeared to be distraught and she was tearful.  She was not   aggressive.  She said, \"I've just been through an unfortunate   series of events and my doctor has been adjusting my meds.  I   texted my friend Mitali asking her if she'd be okay if she wasn't   here.  Mitali came to my house with another friend, Poppy.    They tried to convince me that " "they loved me, but I felt   ambushed.  Another friend, Iveth texted me that she tried to   love the me, but she said I just couldn't be loved. That was when   I took the pills.  I was overwhelmed.  I was defensive and the   friends were defensive.  They said I throw insults at people. If   they would've just crawled into bed and hugged me, that would've   helped.  They told me I didn't ask them to do it.  Now, I'm   embarrassed and I feel shame.\"  Patient denied psychosis or albert   symptoms.  She has not slept well or ate well.  She's worried   about her foster mother she calls \"mom\" because she doesn't feel   like her favorite anymore after patient helped clean out her   hoarder home and about her job.  She's worried about what will   happen with her job, if she misses too much time and she's   worried about the cost of admission. This  tried to   encourage her to get help from staff to learn how to talk to her   job about time off and to ask for help with medical payments.    Patient's insight, judgment, and impulse control were impaired.    Brief Psychosocial History     Patient lives with two male roommates.  She grew up in foster   care.  Her foster care homes were in [place], [place],   [facility], and [place].  She stated that she aged out   of foster care.  She's not talking to her aunt.  She's not   talking to her sister because the last time she did, she found   out she was on meth.  She doesn't talk to her older brother   often, \"because he has his own thing going on,\" she said.  She   talks to her foster mom, who she called mom and her name is   Justyna.  She completed high school and she obtained a B.A.    She's worked at [employer] for 2 1/2 years.    Significant Clinical History     Patient had prior diagnoses of depression, anxiety, and PTSD.    She was last in the ED for MH symptoms in September 2022, after   she thought she was hallucinating.  She has not been admitted, " "  before.  She has a Psychiatrist and a Therapist.  She drank a   bottle of wine on Monday.  She smoked cannabis, yesterday.     Collateral Information    The following information was received from Antonio Falcon whose   relationship to the patient is roommate/friend. Information was   obtained via phone. Their phone number is [phone] and they   last had contact with patient today.    What happened today: Antonio said the patient said she was fine and   then she had \"aggressive upsetness\" while she was talking to her   friends, today.  Her tone of voice was different.  It seemed like   her other friends tried to help, but she got mad at them.    What is different about patient's functioning: Usually, the   patient is more \" and talkative,\" he said.  She's had more   mood swings, since yesterday.    Concern about alcohol/drug use: No    What do you think the patient needs: Not stated    Has patient made comments about wanting to kill   themselves/others:  No    If d/c is recommended, can they take part in safety/aftercare   planning: Yes \"our other roommate and I care about her and we   want to be here for her,\" Antonio said.    Other information: Antonio said \"I hope she's starting to feel   better, now.  We're here and waiting for her.\"       Risk Assessment    [place] Suicide Severity Rating Scale Full Clinical Version:   5/09/2023  Suicidal Ideation  1. Wish to be Dead (Lifetime): Yes  1. Wish to be Dead (Past 1 Month): Yes  2. Non-Specific Active Suicidal Thoughts (Lifetime): Yes  2. Non-Specific Active Suicidal Thoughts (Past 1 Month): Yes  Non-Specific Active Suicidal Thought Description (Past 1 Month):   Patient told ED staff other than  that she wished she   would've taken the whole bottle of Benadryl.  3. Active Suicidal Ideation with any Methods (Not Plan) Without   Intent to Act (Lifetime): No  3. Active Suicidal Ideation with any Methods (Not Plan) Without   Intent to Act (Past 1 Month): " No  4. Active Suicidal Ideation with Some Intent to Act, Without   Specific Plan (Lifetime): No  4. Active Suicidal Ideation with Some Intent to Act, Without   Specific Plan (Past 1 Month): No  5. Active Suicidal Ideation with Specific Plan and Intent   (Lifetime): Yes  5. Active Suicidal Ideation with Specific Plan and Intent (Past 1   Month): Yes  Active Suicidal Ideation with Specific Plan and Intent   Description (Past 1 Month): Take whole bottle of Benadryl as   opposed to taking a handful of Benadryl     Suicidal Behavior  Actual Attempt (Lifetime): Yes  Total Number of Actual Attempts (Lifetime): 1  Actual Attempt (Past 3 Months): Yes  Total Number of Actual Attempts (Past 3 Months): 1  Actual Attempt Description (Past 3 Months): overdose on Benadryl  Has subject engaged in non-suicidal self-injurious behavior?   (Lifetime): Yes  Has subject engaged in non-suicidal self-injurious behavior?   (Past 3 Months): No  Interrupted Attempts (Lifetime): No  Aborted or Self-Interrupted Attempt (Lifetime): No  Preparatory Acts or Behavior (Lifetime): No  C-SSRS Risk (Lifetime/Recent)  Calculated C-SSRS Risk Score (Lifetime/Recent): High Risk    [place] Suicide Severity Rating Scale Since Last Contact:   5/09/2023        Validity of evaluation is impacted by presenting factors during   interview patient seemed to realize she may have to stay, so she   seemed to minimize her SI to .   Comments regarding subjective versus objective responses to   [place] tool:   Environmental or Psychosocial Events: public humiliation,   bullied/abused, challenging interpersonal relationships,   helplessness/hopelessness and other life stressors  Chronic Risk Factors: history of suicide attempts (5/9/2023),   history of abuse or neglect, parental substance abuse issue and   history of Non-Suicidal Self Injury (NSSI)   Warning Signs: seeking access to means to hurt or kill self,   talking or writing about death, dying, or suicide,  hopelessness,   withdrawing from friends, family, and society, anxiety,   agitation, unable to sleep, sleeping all the time, dramatic   changes in mood and no reason for living, no sense of purpose in   life  Protective Factors: lives in a responsibly safe and stable   environment  Interpretation of Risk Scoring, Risk Mitigation Interventions and   Safety Plan:  High Risk     Does the patient have thoughts of harming others? No     Is the patient engaging in sexually inappropriate behavior?  no        Current Substance Abuse     Is there recent substance abuse? no     Was a urine drug screen or blood alcohol level obtained: No       Mental Status Exam     Affect: Appropriate   Appearance: Appropriate    Attention Span/Concentration: Attentive  Eye Contact: Engaged   Fund of Knowledge: Appropriate    Language /Speech Content: Fluent   Language /Speech Volume: Normal    Language /Speech Rate/Productions: Normal    Recent Memory: Variable   Remote Memory: Variable   Mood: Anxious, Depressed and Sad    Orientation to Person: Yes    Orientation to Place: Yes   Orientation to Time of Day: Yes    Orientation to Date: Yes    Situation (Do they understand why they are here?): Yes    Psychomotor Behavior: Normal    Thought Content: Suicidal   Thought Form: Intact      History of commitment: No    Medication    Psychotropic medications: Lexapro, Wellbutrin, Gabapentin  Medication changes made in the last two weeks: No       Current Care Team    Primary Care Provider: No  Psychiatrist: Tanna Broderick [place]  Therapist: Jody Fierro [facility] [place]   [phone]  : No     CTSS or ARMHS: No  ACT Team: No  Other: No      Diagnosis       311 (F32.9) Unspecified Depressive Disorder   300.00 (F41.9) Unspecified Anxiety Disorder  309.81 (F43.10) Posttraumatic Stress Disorder (includes   Posttraumatic Stress Disorder for Children 6 Years and Younger)    Without dissociative symptoms - by  history        Clinical Summary and Substantiation of Recommendations    It is the recommendation of this clinician that pt admit to IP MH   for further assessment, safety, and stabilization. Pt displays   the following risk factors that support IP admission: Patient   ingested a handful of Benadryl as a suicide attempt and then she   told the ED staff she should've taken the whole bottle. She   scored high on [place] Suicide Risk scale.  She was very   distraught and unable to plan for her safety.  Pt is unable to   engage in safety planning to mitigate risk level in a non-secure   setting. Lower levels of care would not be sufficient in managing   the level of risk pt is presenting with. Due to this IP is the   least restrictive option of care for pt. Pt should remain in IP   until deemed safe to return to the community and engage in [facility]   supports. Pt will be able to resume work with established   providers upon discharge.    Admission to Inpatient Level of Care is indicated due to:    1. Patient risk of severity of behavioral health disorder is   appropriate to proposed level of care as indicated by:    Imminent Risk of Harm: Very Recent suicide attempt or deliberate   act of serious harm to self WITHOUT relief of factors   precipitating the attempt or act  And/or:  Behavioral health disorder is present and appropriate for   inpatient care with both of the following:     Severe psychiatric, behavioral or other comorbid conditions are   appropriate for management at inpatient mental health as   indicated by at least one of the following:   o Depressive symptoms and Anxiety and Impaired impulse control,   judgement, or insight    Severe dysfunction in daily living is present as indicated by   at least one of the following:   o Extreme deterioration in social interactions    2. Inpatient mental health services are necessary to meet patient   needs and at least one of the following:  Specific condition related to  admission diagnosis is present and   judged likely to deteriorate in absence of treatment at proposed   level of care    3. Situation and expectations are appropriate for inpatient care,   as indicated by one of the following:   Patient management/treatment at lower level of care is not   feasible or is inappropriate    Disposition    Recommended disposition: Inpatient Mental Health       Reviewed case and recommendations with attending provider.   Attending Name: Kaley Barrett MD       Attending concurs with disposition: Yes       Patient and/or validated legal guardian concurs with disposition:   Yes , but patient would prefer to discharge tomorrow.      Final disposition: Inpatient mental health .     Inpatient Details (if applicable):   Is patient admitted voluntarily:Yes, but holdable as relayed by   ED MD      Patient aware of potential for transfer if there is not   appropriate placement? Yes       Patient is willing to travel outside of the [facility] for   placement? Yes      Behavioral Intake Notified? Yes: Date: 5/10/2023 Time: 12:05 am.     Assessment Details    Patient interview started at: 8:30 pm and completed at: 9:30 pm.     Total duration spent on the patient case in minutes: 1.0 hrs      CPT code(s) utilized: 45266 - Psychotherapy for Crisis - 60   (30-74*) min       Kerri Germain, LICSW, MSW, LICSW, Psychotherapist  DEC - Triage & Transition Services  Callback: [phone]               EKG 12 lead     Status: None (Preliminary result)    Collection Time: 05/09/23  7:03 PM   Result Value Ref Range    Systolic Blood Pressure  mmHg    Diastolic Blood Pressure  mmHg    Ventricular Rate 68 BPM    Atrial Rate 68 BPM    ME Interval 162 ms    QRS Duration 90 ms     ms    QTc 435 ms    P Axis 16 degrees    R AXIS 70 degrees    T Axis 35 degrees    Interpretation ECG Sinus rhythm  Normal ECG      Symptomatic Influenza A/B, RSV, & SARS-CoV2 PCR (COVID-19) Nasopharyngeal     Status: Normal     Collection Time: 05/09/23  7:04 PM    Specimen: Nasopharyngeal; Swab   Result Value Ref Range    Influenza A PCR Negative Negative    Influenza B PCR Negative Negative    RSV PCR Negative Negative    SARS CoV2 PCR Negative Negative    Narrative    Testing was performed using the Xpert Xpress CoV2/Flu/RSV Assay on the [employer] GeneXpert Instrument. This test should be ordered for the detection of SARS-CoV-2, influenza, and RSV viruses in individuals who meet clinical and/or epidemiological criteria. Test performance is unknown in asymptomatic patients. This test is for in vitro diagnostic use under the FDA EUA for laboratories certified under CLIA to perform high or moderate complexity testing. This test has not been FDA cleared or approved. A negative result does not rule out the presence of PCR inhibitors in the specimen or target RNA in concentration below the limit of detection for the assay. If only one viral target is positive but coinfection with multiple targets is suspected, the sample should be re-tested with another FDA cleared, approved, or authorized test, if coinfection would change clinical management. This test was validated by the [facility] [employer] These laboratories are certified under the Clinical Laboratory Improvement Amendments of 1988 (CLIA-88) as qualified to perform high complexity laboratory testing.             MD Dina Gutierrez David, MD  05/10/23 0724

## 2023-05-10 NOTE — TELEPHONE ENCOUNTER
S: [facility] , DEC  Kerri calling at 0001 about a 33 year old/Female presenting after an intentional overdose.       B: Pt arrived via Friend. Presenting problem: SA via OD    Pt affect in ED: Depressed  Pt Dx: Major Depressive Disorder, Generalized Anxiety Disorder and PTSD  Previous IPMH hx? No  Pt endorses SI with a plan to overdose   Hx of suicide attempt? No  Pt endorses SIB via cutting, most recent episode none recent  Pt denies HI   Pt denies hallucinations .   Pt RARS Score: 2    Hx of aggression/violence, sexual offenses, legal concerns, Epic care plan? describe: No  Current concerns for aggression this visit? No  Does pt have a history of Civil Commitment? No  Is Pt their own guardian? Yes    Pt is prescribed medication. Is patient medication compliant? Yes  Pt endorses OP services: Psychiatrist and Therapist  CD concerns: None  Acute or chronic medical concerns: Medically cleared from the overdose around 0000.  Does Pt present with specific needs, assistive devices, or exclusionary criteria? None      Pt is ambulatory  Pt is able to perform ADLs independently      A: Pt to be reviewed for [facility] admission. Pt is Voluntary  Preferred placement: Statewide    COVID:Negative  Utox: Ordered, not yet collected   CMP: Abnormalities: Anion Gap 16, Creatinine 1.19  CBC: WNL  HCG: Ordered, not yet collected    R: Patient cleared and ready for behavioral bed placement: Yes  Pt placed on [facility] worklist? Yes    0010 [place] CRN will review.  Awaiting callback.    0408 Intake received notification that pt was accepted for [facility].  Placed in queue at 0415.  ED notified of disposition at 0416.  [facility] notified at 0418.    0437 ED called to say that pt does not want to go to [place] because of the distance.    0440 Shakir CRN at [place] notified and stated that pt's would get assistance getting transportation back to the [facility].    0443 ED checked with pt and she confirmed that she does not want to  go to to [place].  0453 Pt has been removed from the queue.  Wait list and admit board updated.    0510 [facility] has been notified.

## 2023-05-10 NOTE — TELEPHONE ENCOUNTER
1:50 PM: Intake received a call from Simona with [facility] informing that Pt was discharging home. Pt has a safety plan and will connect with OP services.

## 2023-05-10 NOTE — ED NOTES
IP MH Referral Acuity Rating Score (RARS)    LMHP complete at referral to IP MH, with DEC; and, daily while awaiting IP MH placement. Call score to PPS.  CRITERIA SCORING   New 74 HH and Involuntary for IP MH (not adolescent) 0/1   Boarding over 24 hours 0/1   Vulnerable adult at least 55+ with multiple co morbidities; or, Patient age 11 or under 0/1   Suicide ideation without relief of precipitating factors 1/1   Current plan for suicide 1/1   Current plan for homicide 0/1   Imminent risk or actual attempt to seriously harm another without relief of factors precipitating the attempt 0/1   Severe dysfunction in daily living (ex: complete neglect for self care, extreme disruption in vegetative function, extreme deterioration in social interactions) 0/1   Recent (last 2 weeks) or current physical aggression in the ED 0/1   Restraints or seclusion episode in ED 0/1   Verbal aggression, agitation, yelling, etc., while in the ED 0/1   Active psychosis with psychomotor agitation or catatonia 0/1   Need for constant or near constant redirection (from leaving, from others, etc).  0/1   Intrusive or disruptive behaviors 0/1   TOTAL Acuity Total Score: 2

## 2023-05-10 NOTE — DISCHARGE INSTRUCTIONS
It is recommended that you follow up with your established providers (psychiatrist, mental health therapist, and/or primary care doctor - as relevant) as soon as possible.    Coordinators from [facility] will be calling you in the next 24-48 hours to ensure that you have the resources you need.  You can also contact [facility] coordinators directly at [phone].    Aftercare Plan  If I am feeling unsafe or I am in a crisis, I will:   Contact my established care providers   Call the National Suicide Prevention Lifeline: 988  Go to the nearest emergency room   Call 911     Warning signs that I or other people might notice when a crisis is developing for me:   Isolating to room, increase stress at work, feeling hopeless/helpless    Things I am able to do on my own to cope or help me feel better:  Go for a run  Read  DBT skills- Wise mind, mediation, mindfulness,   Talk with supportive friends  Spend time with mom     More Skills I can try:  The following DBT skills can assist me when: I want to act on your emotions and acting on them will only make things worse, I am overwhelmed by my emotions, I want to try to be skillful and not act on self destructive behavior.     Reduce Extreme Emotion  QUICKLY:  Changing Your Body Chemistry       T:  Change your body Temperature to change your autonomic nervous system    Use Ice pack to calm yourself down FAST. Place ice pack underneath your eyes for a count of 30 seconds to initiate the divers reflex which will naturally calm down your heart rate and breathing.       I:  Intensely exercise to calm down a body revved up by emotion    Examples: running, walking fast, jumping, playing basketball, weight lifting, swimming, calisthenics, etc.       Engage in exercises that DO NOT include violent behaviors. Exercises that utilize violent behaviors tend to function as  behavioral rehearsal,  and rather than calming the person down, may actually  rev  the person up more, increasing the likelihood  of violence, and lessening the likelihood that they will  burn off  energy     P:  Progressively relax your muscles    Starting with your hands, moving to your forearms, upper arms, shoulders, neck, forehead, eyes, cheeks and lips, tongue and teeth, chest, upper back, stomach, buttocks, thighs, calves, ankles, feet       Tense (10 seconds,   of the way), then relax each muscle (all the way)    Notice the tension    Notice the difference when relaxed (by tensing first, and then relaxing, you are able to get a more thorough relaxation than by simply relaxing)       P: Paced breathing to relax    The standard technique is to begin with counting the number of steps one takes for a typical inhale, then counting the steps one takes for a typical exhale, and then lengthening the amount of steps for the exhalation by one or two steps.  OR repeat this pattern for 1-2 minutes:  Inhale for four (4) seconds    Exhale for six (6) to eight (8) seconds          After using Distress Tolerance TIPP, TRY TO STOP!     S- Stop    Do not just react on your emotion urge. Stop! Freeze! Do not move a muscle! Your emotions may try to make you act without thinking. Stay in control! Take a step back Take a step back from the situation.    T- Take a break    Let go. Take a deep breath. Do not let your feelings make you act impulsively.    O- Observe    Notice what is going on inside and outside you. What is the situation? What are your thoughts and feelings? What are others saying or doing? Does my emotion make sense, is it justified? What is it that my emotions want me to do? Would that be effective?    P- Proceed mindfully    Act with awareness. In deciding what to do, consider your thoughts and feelings, the situation, and other people s thoughts and feelings. Think about your goals. Ask Wise Mind: Which actions will make it better or worse?        If my emotion action urge would not be effective or helpful, practice acting OPPOSITE to the  EMOTION ACTION URGE can help reduce the intensity or even change the emotion.  Consider these examples: with FEAR we have the urge to run away/avoid. OPPOSITE would be to approach it with caution. ANGER we have the urge to attack. OPPOSITE would be to gently avoid or to demonstrate kindness towards it. SADNESS we have the urge to withdraw/isolate. OPPOSITE would be to get self to move and be active physically or socially.       These additional skills may help with self-soothing and distracting you:       Activities  Focus attention on a task you need to get done. Rent movies; watch TV. Clean a room in your house. Find an event to go to. Play computer games. Go walking. Exercise. Surf the Internet. Write e-mails. Play sports. Go out for a meal or eat a favorite food. Call or go out with a friend. Listen to your iPod; download music. Build something. Spend time with your children. Play cards. Read magazines, books, comics. Do crossword puzzles or Sudoku.     Emotions  Read emotional books or stories, old letters. Watch emotional TV shows; go to emotional movies. Listen to emotional music. (Be sure the event creates different emotions.) Ideas: Scary movies, joke books, comedies, funny records, Yarsani music, soothing music or music that fires you up, going to a store and reading funny greeting cards.     Thoughts  Count to 10; count colors in a painting or poster or out the window; count anything. Repeat words to a song in your mind. Work puzzles. Watch TV or read.     Sensations  Squeeze a rubber ball very hard. Listen to very loud music. Hold ice in your hand or mouth. Go out in the rain or snow. Take a hot or cold shower.  Remember that you can use your 5 senses as helpful self-soothing tools!       Changes I can make to support my mental health and wellness:   Take medication as prescribed, attend scheduled therapy and psychiatry appointments, engage in daily self-care, engage in daily movement, drink water and  "eat balanced      People in my life that I can ask for help:   Antonio  Mom  Supportive friends     Your county has a mental health crisis team you can call 24/7: [facility]  [phone]       Additional resources and information:       Crisis residence:   Self- referral are accepted and can contact 24 hours a day. If you're accepted, you are able to stay for up to 10 days:  [facility] ([place]): [phone]  [facility] ([place]): [phone]  [facility]ía Melendez ([facility]): [phone]  [facility] ([place]): [phone]  [facility] ([facility]): [phone]      Crisis Lines  Crisis Text Line  Text 775309  You will be connected with a trained live crisis counselor to provide support.    Por espanol, texto  ALMAZ a 893623 o texto a 442-AYUDAME en WhatsApp    [employer] (LGBTQ Youth Crisis Line)  9.186.351.5129  text START to 725-484      Community Resources  Fast Tracker  Linking people to mental health and substance use disorder resources  fastZacharon PharmaceuticalsckVGTeln.org     [place] Mental Health Warm Line  Peer to peer support  Monday thru Saturday, 12 pm to 10 pm  622.852.5676 or 1.081.303.3473  Text \"Support\" to 89785    National [place] [employer]ness (ARSLAN)  035.525.4932 or 1.888.ARSLAN.HELPS      Mental Health Apps  My3  https://Music[employer] Analyticspp.org/    VirtualHopeBox  https://[employer].org/apps/virtual-hope-box/      Additional Information  Today you were seen by a licensed mental health professional through Triage and Transition services, [facility] (P)  for a crisis assessment in the Emergency Department at [facility].  It is recommended that you follow up with your established providers (psychiatrist, mental health therapist, and/or primary care doctor - as relevant) as soon as possible. Coordinators from [facility] will be calling you in the next 24-48 hours to ensure that you have the resources you need.  You can also contact [facility] " coordinators directly at [phone]. You may have been scheduled for or offered an appointment with a mental health provider. [facility] maintains an extensive network of licensed behavioral health providers to connect patients with the services they need.  We do not charge providers a fee to participate in our referral network.  We match patients with providers based on a patient's specific needs, insurance coverage, and location.  Our first effort will be to refer you to a provider within your care system, and will utilize providers outside your care system as needed.

## 2023-05-10 NOTE — PROGRESS NOTES
"Triage & Transition Services, [facility]      Client Name: Katharina Roa \"Katharina\"   Date: May 10, 2023  Service Type:  Group Therapy  Site Location: [facility]  Facilitator: Lore Geller     Topic:   Art Group     Intervention:    Patient was in the lounge and writer asked pt if she would like to participate in group.     Response:  Patient declined participating in group and did not participate in group.     Lore Geller  [facility] Coordinator  "

## 2023-05-10 NOTE — ED NOTES
Patient arrived to the unit with ED staff. She was tearful at times. She states she is embarrassed with her actions. She is pleasant and cooperative. She was orientated to the unit.

## 2023-05-10 NOTE — PLAN OF CARE
Katharina Roa  May 10, 2023  Plan of Care Hand-off Note     Patient Care Path: Inpatient Mental Health    Plan for Care:     It is the recommendation of this clinician that pt admit to IP MH for further assessment, safety, and stabilization. Pt displays the following risk factors that support IP admission: Patient ingested a handful of Benadryl as a suicide attempt and then she told the ED staff she should've taken the whole bottle. She scored high on [place] Suicide Risk scale.  She was very distraught and unable to plan for her safety.  Pt is unable to engage in safety planning to mitigate risk level in a non-secure setting. Lower levels of care would not be sufficient in managing the level of risk pt is presenting with. Due to this IP is the least restrictive option of care for pt. Pt should remain in IP until deemed safe to return to the community and engage in OP  supports. Pt will be able to resume work with established providers upon discharge.        Critical Safety Issues: suicide attempt.  Patient is not aggressive.    Overview:  This patient is a child/adolescent: No    This patient has additional special visitor precautions: No    Legal Status: Voluntary, but holdable due to lethal drug ingestion, defer to MD    Contacts:   Antonio Falcon, roommate: Contact [phone]    Psychiatry Consult:  Psychiatry Consult not requested because patient is worried about the cost and said her Psychiatrist is already working on adjusting her meds    Updated RN and Attending Provider regarding plan of care.    Kerri Germain, JANSW

## 2023-05-10 NOTE — CONSULTS
"Diagnostic Evaluation Consultation  Crisis Assessment    Patient was assessed: In Person  Patient location: [facility] ED  Was a release of information signed: Yes. Providers included on the release: MARTIN Garza and EMMA Wood      Referral Data and Chief Complaint  Patient is a 33 year old, who uses she/her pronouns, and presents to the ED with family/friends. Patient is referred to the ED by community provider(s). Patient is presenting to the ED for the following concerns: Suicide attempt by ingestion.  Patient took \"a fistful of Benadryl,\" the patient said.  She denied prior attempts.  She denied further SI, any NSSI, and any HI to this ; but she told the ED MD that she wished she would've taken a whole bottle.        Informed Consent and Assessment Methods     Patient is her own guardian. Writer met with patient and explained the crisis assessment process, including applicable information disclosures and limits to confidentiality, assessed understanding of the process, and obtained consent to proceed with the assessment. Patient was observed to be able to participate in the assessment as evidenced by alert and oriented presentation. Assessment methods included conducting a formal interview with patient, review of medical records, collaboration with medical staff, and obtaining relevant collateral information from family and community providers when available..     Over the course of this crisis assessment provided reassurance, offered validation, assisted in processing patient's thoughts and feeling relating to friend's attempt to help her and provided psychoeducation. Patient's response to interventions was receptive, but she preferred to not stay.     Summary of Patient Situation     Patient was brought in by [facility] staff and friends due to a suicide attempt by ingestion.  She was alert and oriented x 5. She appeared to be distraught and she was tearful.  She was not aggressive.  She " "said, \"I've just been through an unfortunate series of events and my doctor has been adjusting my meds.  I texted my friend Mitali asking her if she'd be okay if she wasn't here.  Mitali came to my house with another friend, Poppy.  They tried to convince me that they loved me, but I felt ambushed.  Another friend, Iveth texted me that she tried to love the me, but she said I just couldn't be loved. That was when I took the pills.  I was overwhelmed.  I was defensive and the friends were defensive.  They said I throw insults at people. If they would've just crawled into bed and hugged me, that would've helped.  They told me I didn't ask them to do it.  Now, I'm embarrassed and I feel shame.\"  Patient denied psychosis or albert symptoms.  She has not slept well or ate well.  She's worried about her foster mother she calls \"mom\" because she doesn't feel like her favorite anymore after patient helped clean out her hoarder home and about her job.  She's worried about what will happen with her job, if she misses too much time and she's worried about the cost of admission. This  tried to encourage her to get help from staff to learn how to talk to her job about time off and to ask for help with medical payments.  Patient's insight, judgment, and impulse control were impaired.    Brief Psychosocial History     Patient lives with two male roommates.  She grew up in foster care.  Her foster care homes were in [place], [place], [place], [place], and [place].  She stated that she aged out of foster care.  She's not talking to her aunt.  She's not talking to her sister because the last time she did, she found out she was on meth.  She doesn't talk to her older brother often, \"because he has his own thing going on,\" she said.  She talks to her foster mom, who she called mom and her name is Justyna.  She completed high school and she obtained a B.A.  She's worked at [employer] for 2 1/2 years.    Significant " "Clinical History     Patient had prior diagnoses of depression, anxiety, and PTSD.  She was last in the ED for MH symptoms in September 2022, after she thought she was hallucinating.  She has not been admitted, before.  She has a Psychiatrist and a Therapist.  She drank a bottle of wine on Monday.  She smoked cannabis, yesterday.     Collateral Information    The following information was received from Antonio Falcon whose relationship to the patient is roommate/friend. Information was obtained via phone. Their phone number is [phone] and they last had contact with patient today.    What happened today: Antonio said the patient said she was fine and then she had \"aggressive upsetness\" while she was talking to her friends, today.  Her tone of voice was different.  It seemed like her other friends tried to help, but she got mad at them.    What is different about patient's functioning: Usually, the patient is more \" and talkative,\" he said.  She's had more mood swings, since yesterday.    Concern about alcohol/drug use: No    What do you think the patient needs: Not stated    Has patient made comments about wanting to kill themselves/others:  No    If d/c is recommended, can they take part in safety/aftercare planning: Yes \"our other roommate and I care about her and we want to be here for her,\" Antonio said.    Other information: Antonio said \"I hope she's starting to feel better, now.  We're here and waiting for her.\"       Risk Assessment    [place] Suicide Severity Rating Scale Full Clinical Version: 5/09/2023  Suicidal Ideation  1. Wish to be Dead (Lifetime): Yes  1. Wish to be Dead (Past 1 Month): Yes  2. Non-Specific Active Suicidal Thoughts (Lifetime): Yes  2. Non-Specific Active Suicidal Thoughts (Past 1 Month): Yes  Non-Specific Active Suicidal Thought Description (Past 1 Month): Patient told ED staff other than  that she wished she would've taken the whole bottle of Benadryl.  3. Active Suicidal " Ideation with any Methods (Not Plan) Without Intent to Act (Lifetime): No  3. Active Suicidal Ideation with any Methods (Not Plan) Without Intent to Act (Past 1 Month): No  4. Active Suicidal Ideation with Some Intent to Act, Without Specific Plan (Lifetime): No  4. Active Suicidal Ideation with Some Intent to Act, Without Specific Plan (Past 1 Month): No  5. Active Suicidal Ideation with Specific Plan and Intent (Lifetime): Yes  5. Active Suicidal Ideation with Specific Plan and Intent (Past 1 Month): Yes  Active Suicidal Ideation with Specific Plan and Intent Description (Past 1 Month): Take whole bottle of Benadryl as opposed to taking a handful of Benadryl     Suicidal Behavior  Actual Attempt (Lifetime): Yes  Total Number of Actual Attempts (Lifetime): 1  Actual Attempt (Past 3 Months): Yes  Total Number of Actual Attempts (Past 3 Months): 1  Actual Attempt Description (Past 3 Months): overdose on Benadryl  Has subject engaged in non-suicidal self-injurious behavior? (Lifetime): Yes  Has subject engaged in non-suicidal self-injurious behavior? (Past 3 Months): No  Interrupted Attempts (Lifetime): No  Aborted or Self-Interrupted Attempt (Lifetime): No  Preparatory Acts or Behavior (Lifetime): No  C-SSRS Risk (Lifetime/Recent)  Calculated C-SSRS Risk Score (Lifetime/Recent): High Risk    [place] Suicide Severity Rating Scale Since Last Contact: 5/09/2023        Validity of evaluation is impacted by presenting factors during interview patient seemed to realize she may have to stay, so she seemed to minimize her SI to .   Comments regarding subjective versus objective responses to Wilkin tool:   Environmental or Psychosocial Events: public humiliation, bullied/abused, challenging interpersonal relationships, helplessness/hopelessness and other life stressors  Chronic Risk Factors: history of suicide attempts (5/9/2023), history of abuse or neglect, parental substance abuse issue and history of  Non-Suicidal Self Injury (NSSI)   Warning Signs: seeking access to means to hurt or kill self, talking or writing about death, dying, or suicide, hopelessness, withdrawing from friends, family, and society, anxiety, agitation, unable to sleep, sleeping all the time, dramatic changes in mood and no reason for living, no sense of purpose in life  Protective Factors: lives in a responsibly safe and stable environment  Interpretation of Risk Scoring, Risk Mitigation Interventions and Safety Plan:  High Risk     Does the patient have thoughts of harming others? No     Is the patient engaging in sexually inappropriate behavior?  no        Current Substance Abuse     Is there recent substance abuse? no     Was a urine drug screen or blood alcohol level obtained: No       Mental Status Exam     Affect: Appropriate   Appearance: Appropriate    Attention Span/Concentration: Attentive  Eye Contact: Engaged   Fund of Knowledge: Appropriate    Language /Speech Content: Fluent   Language /Speech Volume: Normal    Language /Speech Rate/Productions: Normal    Recent Memory: Variable   Remote Memory: Variable   Mood: Anxious, Depressed and Sad    Orientation to Person: Yes    Orientation to Place: Yes   Orientation to Time of Day: Yes    Orientation to Date: Yes    Situation (Do they understand why they are here?): Yes    Psychomotor Behavior: Normal    Thought Content: Suicidal   Thought Form: Intact      History of commitment: No    Medication    Psychotropic medications: Lexapro, Wellbutrin, Gabapentin  Medication changes made in the last two weeks: No       Current Care Team    Primary Care Provider: No  Psychiatrist: Tanna Broderick [place]  Therapist: Jody Fierro [facility] [phone]  : No     CTSS or ARMHS: No  ACT Team: No  Other: No      Diagnosis       311 (F32.9) Unspecified Depressive Disorder   300.00 (F41.9) Unspecified Anxiety Disorder  309.81 (F43.10) Posttraumatic  Stress Disorder (includes Posttraumatic Stress Disorder for Children 6 Years and Younger)  Without dissociative symptoms - by history        Clinical Summary and Substantiation of Recommendations    It is the recommendation of this clinician that pt admit to IP MH for further assessment, safety, and stabilization. Pt displays the following risk factors that support IP admission: Patient ingested a handful of Benadryl as a suicide attempt and then she told the ED staff she should've taken the whole bottle. She scored high on [place] Suicide Risk scale.  She was very distraught and unable to plan for her safety.  Pt is unable to engage in safety planning to mitigate risk level in a non-secure setting. Lower levels of care would not be sufficient in managing the level of risk pt is presenting with. Due to this IP is the least restrictive option of care for pt. Pt should remain in IP until deemed safe to return to the community and engage in [facility] supports. Pt will be able to resume work with established providers upon discharge.    Admission to Inpatient Level of Care is indicated due to:    1. Patient risk of severity of behavioral health disorder is appropriate to proposed level of care as indicated by:    Imminent Risk of Harm: Very Recent suicide attempt or deliberate act of serious harm to self WITHOUT relief of factors precipitating the attempt or act  And/or:  Behavioral health disorder is present and appropriate for inpatient care with both of the following:     Severe psychiatric, behavioral or other comorbid conditions are appropriate for management at inpatient mental health as indicated by at least one of the following:   o Depressive symptoms and Anxiety and Impaired impulse control, judgement, or insight    Severe dysfunction in daily living is present as indicated by at least one of the following:   o Extreme deterioration in social interactions    2. Inpatient mental health services are necessary to  meet patient needs and at least one of the following:  Specific condition related to admission diagnosis is present and judged likely to deteriorate in absence of treatment at proposed level of care    3. Situation and expectations are appropriate for inpatient care, as indicated by one of the following:   Patient management/treatment at lower level of care is not feasible or is inappropriate    Disposition    Recommended disposition: Inpatient Mental Health       Reviewed case and recommendations with attending provider. Attending Name: Kaley Barrett MD       Attending concurs with disposition: Yes       Patient and/or validated legal guardian concurs with disposition: Yes , but patient would prefer to discharge tomorrow.      Final disposition: Inpatient mental health .     Inpatient Details (if applicable):   Is patient admitted voluntarily:Yes, but holdable as relayed by ED MD      Patient aware of potential for transfer if there is not appropriate placement? Yes       Patient is willing to travel outside of the [facility] for placement? Yes      Behavioral Intake Notified? Yes: Date: 5/10/2023 Time: 12:05 am.     Assessment Details    Patient interview started at: 8:30 pm and completed at: 9:30 pm.     Total duration spent on the patient case in minutes: 1.0 hrs      CPT code(s) utilized: 60934 - Psychotherapy for Crisis - 60 (30-74*) min       Kerri Germain, LICSW, MSW, LICSW, Psychotherapist  [facility] - Triage & Transition Services  Callback: [phone]

## 2023-05-10 NOTE — ED NOTES
Pt discharged home with friend. AVS reviewed with Pt, belongings returned. Pt discharged with VSS and behaviorally in control.

## 2023-05-10 NOTE — ED PROVIDER NOTES
The patient was seen by myself and extended care.  She denies suicidal thoughts with plan and intent and is having regret over taking the benadryl.  She says she immediately wanted to throw the pills up after taking them. She denies prior suicide attempt.  She is future thinking.  She has 2 roommates and one roommate works from home and will stay with her.  She sees her therapist tomorrow (see them twice weekly) and sees her psychiatrist next week. Her psychiatrist started her on gabapentin but she hasn't gotten it yet.  She can safety plan.  She finds the environment in [facility] unsupportive and unrestful and wants to go home. She is not holdable at this time.  She will be discharged home with roommate.       Yasmin Paniagua MD  05/10/23 [id number]

## 2023-05-10 NOTE — PROGRESS NOTES
"[facility] Crisis Reassessment      Katharina Roa was reassessed at the request of patient for the following reasons: disposition planning. Pt was first seen on 5/09/2023 by Kerri Germain; see the initial assessment note for details.      Patient Presentation    Initial ED presentation details:   Per initial assessment completed by Kerri Germain on 5/9/2023:  \"Patient was brought in by [facility] staff and friends due to a suicide attempt by ingestion.  She was alert and oriented x 5. She appeared to be distraught and she was tearful.  She was not aggressive.  She said, \"I've just been through an unfortunate series of events and my doctor has been adjusting my meds.  I texted my friend Mitali asking her if she'd be okay if she wasn't here.  Mitali came to my house with another friend, Poppy.  They tried to convince me that they loved me, but I felt ambushed.  Another friend, Iveth texted me that she tried to love the me, but she said I just couldn't be loved. That was when I took the pills.  I was overwhelmed.  I was defensive and the friends were defensive.  They said I throw insults at people. If they would've just crawled into bed and hugged me, that would've helped.  They told me I didn't ask them to do it.  Now, I'm embarrassed and I feel shame.\"  Patient denied psychosis or albert symptoms.  She has not slept well or ate well.  She's worried about her foster mother she calls \"mom\" because she doesn't feel like her favorite anymore after patient helped clean out her hoarder home and about her job.  She's worried about what will happen with her job, if she misses too much time and she's worried about the cost of admission. This  tried to encourage her to get help from staff to learn how to talk to her job about time off and to ask for help with medical payments.  Patient's insight, judgment, and impulse control were impaired.\"    Current patient presentation:   Patient is engaged, cooperative, and has insight " "into current mental health and events leading to hospital. Patient reports feeling \"a lot better\". She shared that yesterday was a \"rough day\" as she has had a lot  of additional life stress that all came to a peak yesterday. Patient reports the attempt yesterday was not premeditated and \"I knew it was a mistake immediately\" and tried to throw up as well as  told [facility] immediately. She reports friends had called [facility] before she took benadryl and she was aware they were on the way.  Patient reports she felt ambushed by friends yesterday and hateful things were said both ways. She shared that she also reached out to an ex-partner who told her she was unlovable. Patient reports she has several good and supportive friends and is focusing on increasing the time she spends with the positive relationships in her life. Patient shared that at baseline she experiences passive suicidal ideation that present has fleeting intrusive thoughts that \"I wont act on\". Patient reports she uses DBT skills to support her in those moments. Patient denies HI. Patient denies hallucinations. Patient reports she has a strong support team that includes friends, mom, therapist and psychiatrist. Patient was future orientated as evidence by sharing several things she is looking forward to which includes halloween and the costume she is making, attending a friends wedding in [place] this summer and hosting a friends birthday party in August. Patient reports she does not feel a inpatient mental health admission would be supportive as she has an established care team in the community and does not feel she is a danger to herself or others.     Changes observed since initial assessment:   Patient was engaged and cooperative  Patient was able to engage and track conversations appropriately  Patient does not appear to be responding to internal stimuli.   Patient denies suicidal and homicidal ideation  Patient actively engaged in safety planning as " evidence by identifying her support system and coping skills  Patient has a scheduled appointment with her therapist tomorrow 5/11/2023.  Patient is scheduled appointment with her psychiatrist next week.  Patient called and confirmed with her pharmacy that new gabapentin prescription her psychiatrist recommended was filled and ready at the pharmacy.     Risk of Harm    Prospect Suicide Severity Rating Scale Since Last Contact: 5/10/2023  Suicidal Ideation (Since Last Contact)  1. Wish to be Dead (Since Last Contact): No  2. Non-Specific Active Suicidal Thoughts (Since Last Contact): Yes  3. Active Suicidal Ideation with any Methods (Not Plan) Without Intent to Act (Since Last Contact): Yes  4. Active Suicidal Ideation with Some Intent to Act, Without Specific Plan (Since Last Contact): No  5. Active Suicidal Ideation with Specific Plan and Intent (Since Last Contact): No  Suicidal Behavior (Since Last Contact)  Actual Attempt (Since Last Contact): No  Has subject engaged in non-suicidal self-injurious behavior? (Since Last Contact): No  Interrupted Attempts (Since Last Contact): No  Aborted or Self-Interrupted Attempt (Since Last Contact): No  Preparatory Acts or Behavior (Since Last Contact): No  Suicide (Since Last Contact): No     C-SSRS Risk (Since Last Contact)  Calculated C-SSRS Risk Score (Since Last Contact): Moderate Risk    Validity of evaluation is not impacted by presenting factors during interview .   Comments regarding subjective versus objective responses to Prospect tool:   Environmental or Psychosocial Events: challenging interpersonal relationships, impulsivity/recklessness and other life stressors  Chronic Risk Factors: history of suicide attempts (1) and personality disorders   Warning Signs: withdrawing from friends, family, and society and anxiety, agitation, unable to sleep, sleeping all the time  Protective Factors: lives in a responsibly safe and stable environment, good treatment  "engagement, sense of importance of health and wellness, able to access care without barriers, supportive ongoing medical and mental health care relationships, help seeking and optimistic outlook - identification of future goals  Interpretation of Risk Scoring, Risk Mitigation Interventions and Safety Plan:  Patient is currently denying SI/HI. Patient reports the overdose last night was not premeditated and was \"immediately regretted it\". Patient active engaged in safety planning as evidence by identifying support team and coping skills as well as calling her psychiatry to ensure new medications were filled. Patient was future orientated as evidence by sharing several things she is looking forward to which includes halloween and the costume she is making, attending a friends wedding in [place] this summer and hosting a friends birthday party this August.     Does the patient have thoughts of harming others? No    Mental Status Exam   Affect: Appropriate   Appearance: Appropriate    Attention Span/Concentration: Attentive?    Eye Contact: Engaged   Fund of Knowledge: Appropriate    Language /Speech Content: Fluent   Language /Speech Volume: Normal    Language /Speech Rate/Productions: Hyperverbal    Recent Memory: Intact   Remote Memory: Intact   Mood: Anxious    Orientation to Person: Yes    Orientation to Place: Yes   Orientation to Time of Day: Yes    Orientation to Date: Yes    Situation (Do they understand why they are here?): Yes    Psychomotor Behavior: Normal    Thought Content: Clear   Thought Form: Intact       Additional Collateral Information   Writer spoke with patient's roommate/friend Antonio. He agrees to remove all medication and place it in a secure location. Antonio confirmed that he works from home. He will be home all week/weekend and is willing and wanting to be present for the patient.       Therapeutic Intervention  The following therapeutic methodologies were employed when working with the patient: " Establishing rapport, Assess dimensions of crisis, Establish a discharge plan and Safety planning. Patient response to intervention: engaged.    Diagnosis:   311 (F32.9) Unspecified Depressive Disorder   300.00 (F41.9) Unspecified Anxiety Disorder  309.81 (F43.10) Posttraumatic Stress Disorder (includes Posttraumatic Stress Disorder for Children 6 Years and Younger)  Without dissociative symptoms - by history     Clinical Substantiation of Recommendations  After brief therapeutic intervention, observation, and aftercare planning by [facility] and in consultation with attending provider, the patient's initial crisis appears to have resolved and patients current mental state is appropriate for outpatient management due to the following factors: denies suicidal ideation, denies homicidal ideation, willing and wanting to engage in outpatient services, has an established outpatient care team with scheduled appointments for this week, actively engaged in safety planning and is future orientated.  Patient reports she does not feel a inpatient mental health admission would be supportive as she has an established care team in the community and does not feel she is a danger to herself or others. Patient is requesting to discharge home. At this time patient does not appear to be at imminent danger to herself or other thus does not meet criteria for a 72 hour hold.     Plan:    Disposition  Recommended disposition: Individual Therapy and Medication Management      Reviewed case and recommendations with attending provider. Attending Name: Dr. Paniagua     Attending concurs with disposition: Yes      Patient and/or verified legal guardian concurs with disposition: Yes      Final disposition: Individual therapy  and Medication management.         Assessment Details  Total duration spent on the patient case in minutes: .75 hrs     CPT code(s) utilized: 39109 - Psychotherapy for Crisis (Each additional 30 minutes) - 30 min         Simona Yanez, [facility], [facility]  Callback: [phone]      It is recommended that you follow up with your established providers (psychiatrist, mental health therapist, and/or primary care doctor - as relevant) as soon as possible.    Coordinators from [facility] will be calling you in the next 24-48 hours to ensure that you have the resources you need.  You can also contact [facility] coordinators directly at [phone].    Aftercare Plan  If I am feeling unsafe or I am in a crisis, I will:   Contact my established care providers   Call the National Suicide Prevention Lifeline: 988  Go to the nearest emergency room   Call 911     Warning signs that I or other people might notice when a crisis is developing for me:   Isolating to room, increase stress at work, feeling hopeless/helpless    Things I am able to do on my own to cope or help me feel better:  Go for a run  Read  DBT skills- Wise mind, mediation, mindfulness,   Talk with supportive friends  Spend time with mom     More Skills I can try:  The following DBT skills can assist me when: I want to act on your emotions and acting on them will only make things worse, I am overwhelmed by my emotions, I want to try to be skillful and not act on self destructive behavior.     Reduce Extreme Emotion  QUICKLY:  Changing Your Body Chemistry       T:  Change your body Temperature to change your autonomic nervous system    Use Ice pack to calm yourself down FAST. Place ice pack underneath your eyes for a count of 30 seconds to initiate the divers reflex which will naturally calm down your heart rate and breathing.       I:  Intensely exercise to calm down a body revved up by emotion    Examples: running, walking fast, jumping, playing basketball, weight lifting, swimming, calisthenics, etc.       Engage in exercises that DO NOT include violent behaviors. Exercises that utilize violent behaviors tend to function as  behavioral rehearsal,  and rather than calming the person down,  may actually  rev  the person up more, increasing the likelihood of violence, and lessening the likelihood that they will  burn off  energy     P:  Progressively relax your muscles    Starting with your hands, moving to your forearms, upper arms, shoulders, neck, forehead, eyes, cheeks and lips, tongue and teeth, chest, upper back, stomach, buttocks, thighs, calves, ankles, feet       Tense (10 seconds,   of the way), then relax each muscle (all the way)    Notice the tension    Notice the difference when relaxed (by tensing first, and then relaxing, you are able to get a more thorough relaxation than by simply relaxing)       P: Paced breathing to relax    The standard technique is to begin with counting the number of steps one takes for a typical inhale, then counting the steps one takes for a typical exhale, and then lengthening the amount of steps for the exhalation by one or two steps.  OR repeat this pattern for 1-2 minutes:  Inhale for four (4) seconds    Exhale for six (6) to eight (8) seconds          After using Distress Tolerance TIPP, TRY TO STOP!     S- Stop    Do not just react on your emotion urge. Stop! Freeze! Do not move a muscle! Your emotions may try to make you act without thinking. Stay in control! Take a step back Take a step back from the situation.    T- Take a break    Let go. Take a deep breath. Do not let your feelings make you act impulsively.    O- Observe    Notice what is going on inside and outside you. What is the situation? What are your thoughts and feelings? What are others saying or doing? Does my emotion make sense, is it justified? What is it that my emotions want me to do? Would that be effective?    P- Proceed mindfully    Act with awareness. In deciding what to do, consider your thoughts and feelings, the situation, and other people s thoughts and feelings. Think about your goals. Ask Wise Mind: Which actions will make it better or worse?        If my emotion action urge  would not be effective or helpful, practice acting OPPOSITE to the EMOTION ACTION URGE can help reduce the intensity or even change the emotion.  Consider these examples: with FEAR we have the urge to run away/avoid. OPPOSITE would be to approach it with caution. ANGER we have the urge to attack. OPPOSITE would be to gently avoid or to demonstrate kindness towards it. SADNESS we have the urge to withdraw/isolate. OPPOSITE would be to get self to move and be active physically or socially.       These additional skills may help with self-soothing and distracting you:       Activities  Focus attention on a task you need to get done. Rent movies; watch TV. Clean a room in your house. Find an event to go to. Play computer games. Go walking. Exercise. Surf the Internet. Write e-mails. Play sports. Go out for a meal or eat a favorite food. Call or go out with a friend. Listen to your iPod; download music. Build something. Spend time with your children. Play cards. Read magazines, books, comics. Do crossword puzzles or Sudoku.     Emotions  Read emotional books or stories, old letters. Watch emotional TV shows; go to emotional movies. Listen to emotional music. (Be sure the event creates different emotions.) Ideas: Scary movies, joke books, comedies, funny records, Hinduism music, soothing music or music that fires you up, going to a store and reading funny greeting cards.     Thoughts  Count to 10; count colors in a painting or poster or out the window; count anything. Repeat words to a song in your mind. Work puzzles. Watch TV or read.     Sensations  Squeeze a rubber ball very hard. Listen to very loud music. Hold ice in your hand or mouth. Go out in the rain or snow. Take a hot or cold shower.  Remember that you can use your 5 senses as helpful self-soothing tools!       Changes I can make to support my mental health and wellness:   Take medication as prescribed, attend scheduled therapy and psychiatry appointments,  "engage in daily self-care, engage in daily movement, drink water and eat balanced      People in my life that I can ask for help:   Antonio  Mom  Supportive friends     Your county has a mental health crisis team you can call 24/7: [facility]  [phone]       Additional resources and information:       Crisis residence:   Self- referral are accepted and can contact 24 hours a day. If you're accepted, you are able to stay for up to 10 days:  [facility] ([place]): [phone]  [facility] ([place]): [phone]  María Melendez ([facility]): [phone]  [facility]): [phone]  [facility] ([facility]): [phone]      Crisis Lines  Crisis Text Line  Text 519857  You will be connected with a trained live crisis counselor to provide support.    Por espanol, texto  ALMAZ a 373322 o texto a 442-AYUDAME en WhatsApp    [employer] (LGBTQ Youth Crisis Line)  1.628.608.5251  text START to 727-081      Community Resources  Fast Tracker  Linking people to mental health and substance use disorder resources  fasttraAirInSpacen.org     [place] Mental Health Warm Line  Peer to peer support  Monday thru Saturday, 12 pm to 10 pm  799.378.4125 or 2.081.309.3340  Text \"Support\" to 97671    National [place] on Mental Illness (ARSLAN)  179.119.6320 or 1.888.ARSLAN.HELPS      Mental Health Apps  My3  https://myLumiantpp.org/    VirtualHopeBox  https://Let it [email or web address]/apps/virtual-hope-box/      Additional Information  Today you were seen by a licensed mental health professional through Triage and Transition services, [facility])  for a crisis assessment in the Emergency Department at [facility].  It is recommended that you follow up with your established providers (psychiatrist, mental health therapist, and/or primary care doctor - as relevant) as soon as possible. Coordinators from [facility] will be calling you in the next 24-48 hours to ensure that " you have the resources you need.  You can also contact [facility] coordinators directly at [phone]. You may have been scheduled for or offered an appointment with a mental health provider. [facility] maintains an extensive network of licensed behavioral health providers to connect patients with the services they need.  We do not charge providers a fee to participate in our referral network.  We match patients with providers based on a patient's specific needs, insurance coverage, and location.  Our first effort will be to refer you to a provider within your care system, and will utilize providers outside your care system as needed.

## 2023-05-10 NOTE — ED NOTES
Pt declining to go to [place] as pt does not have a way to get home and [place] not [place]. MD and Intake updated.

## 2023-10-03 ENCOUNTER — TELEPHONE (OUTPATIENT)
Dept: FAMILY MEDICINE | Facility: CLINIC | Age: 33
End: 2023-10-03

## 2023-10-03 NOTE — LETTER
October 3, 2023    Katharina Roa  [address] [address]      Dear Katharina,    At [facility] we care about your health and are committed to providing quality patient care.     Here is a list of Health Maintenance topics that are due now or due soon:  Health Maintenance Due   Topic Date Due    YEARLY PREVENTIVE VISIT  Never done    ANNUAL REVIEW OF HM ORDERS  Never done    ADVANCE CARE PLANNING  Never done    DEPRESSION ACTION PLAN  Never done    HIV SCREENING  Never done    HEPATITIS C SCREENING  Never done    DTAP/TDAP/TD IMMUNIZATION (9 - Td or Tdap) 09/26/2017    PAP  08/22/2019    PHQ-9  05/04/2023    INFLUENZA VACCINE (1) 09/01/2023    COVID-19 Vaccine (3 - 2023-24 season) 09/01/2023        We are recommending that you:  Schedule a WELLNESS (Preventative/Physical) APPOINTMENT with your primary care provider. If you go elsewhere for your wellness appointments then please disregard this reminder    ,   Schedule a PAP SMEAR EXAM which is due.  Please disregard this reminder if you have had this exam elsewhere within the last year.  It would be helpful for us to have a copy of your recent pap smear report in our file so that we can best coordinate your care.    If you are under/uninsured, we recommend you contact the [facility]. They offer pap smears at no charge or on a sliding fee charge. You can schedule with them at [phone]. Please have them send us the results.    , and   Complete the attached Questionnaire:  You are due to complete the attached PHQ questionnaire. Please complete as soon as you are able.    To schedule an appointment or discuss this further, you may contact us by phone at the [facility] at [phone] or online through the patient [email or web address] @ [email or web address]    Thank you for trusting [facility] and we appreciate the opportunity to serve you.  We look forward to supporting your healthcare  needs in the future.    Your partners in health,      Quality Committee at [facility]

## 2023-10-03 NOTE — TELEPHONE ENCOUNTER
Patient Quality Outreach    Patient is due for the following:   Cervical Cancer Screening - PAP Needed  Depression  -  PHQ-9 needed and PHQ-A needed  Physical Preventive Adult Physical    Next Steps:   Schedule a Adult Preventative    Type of outreach:    Sent letter.    Next Steps:  Reach out within 90 days via Phone.    Max number of attempts reached: No. Will try again in 90 days if patient still on fail list.    Questions for provider review:    None           Jaylen Lares MA

## 2025-06-27 ENCOUNTER — LAB REQUISITION (OUTPATIENT)
Dept: LAB | Facility: CLINIC | Age: 35
End: 2025-06-27
Payer: COMMERCIAL

## 2025-06-27 DIAGNOSIS — Z12.4 ENCOUNTER FOR SCREENING FOR MALIGNANT NEOPLASM OF CERVIX: ICD-10-CM

## 2025-06-27 PROCEDURE — 87624 HPV HI-RISK TYP POOLED RSLT: CPT | Mod: ORL | Performed by: OBSTETRICS & GYNECOLOGY

## 2025-06-27 PROCEDURE — G0145 SCR C/V CYTO,THINLAYER,RESCR: HCPCS | Mod: ORL | Performed by: OBSTETRICS & GYNECOLOGY

## 2025-06-30 LAB
HPV HR 12 DNA CVX QL NAA+PROBE: NEGATIVE
HPV16 DNA CVX QL NAA+PROBE: NEGATIVE
HPV18 DNA CVX QL NAA+PROBE: NEGATIVE
HUMAN PAPILLOMA VIRUS FINAL DIAGNOSIS: NORMAL

## 2025-07-02 LAB
BKR AP ASSOCIATED HPV REPORT: NORMAL
BKR LAB AP GYN ADEQUACY: NORMAL
BKR LAB AP GYN INTERPRETATION: NORMAL
BKR LAB AP LMP: NORMAL
BKR LAB AP PREVIOUS ABNL DX: NORMAL
BKR LAB AP PREVIOUS ABNORMAL: NORMAL
PATH REPORT.COMMENTS IMP SPEC: NORMAL
PATH REPORT.COMMENTS IMP SPEC: NORMAL
PATH REPORT.RELEVANT HX SPEC: NORMAL